# Patient Record
Sex: FEMALE | Race: WHITE | HISPANIC OR LATINO | Employment: FULL TIME | URBAN - METROPOLITAN AREA
[De-identification: names, ages, dates, MRNs, and addresses within clinical notes are randomized per-mention and may not be internally consistent; named-entity substitution may affect disease eponyms.]

---

## 2017-05-03 ENCOUNTER — OFFICE VISIT (OUTPATIENT)
Dept: URGENT CARE | Facility: CLINIC | Age: 27
End: 2017-05-03
Payer: COMMERCIAL

## 2017-05-03 VITALS
OXYGEN SATURATION: 96 % | SYSTOLIC BLOOD PRESSURE: 120 MMHG | DIASTOLIC BLOOD PRESSURE: 84 MMHG | BODY MASS INDEX: 27.98 KG/M2 | WEIGHT: 153 LBS | HEART RATE: 72 BPM | TEMPERATURE: 98.2 F | RESPIRATION RATE: 16 BRPM

## 2017-05-03 DIAGNOSIS — M67.432 GANGLION CYST OF VOLAR ASPECT OF LEFT WRIST: ICD-10-CM

## 2017-05-03 PROCEDURE — 99213 OFFICE O/P EST LOW 20 MIN: CPT | Performed by: PHYSICIAN ASSISTANT

## 2017-05-03 RX ORDER — FLUOXETINE HYDROCHLORIDE 20 MG/1
20 CAPSULE ORAL DAILY
COMMUNITY
End: 2018-02-01

## 2017-05-03 ASSESSMENT — ENCOUNTER SYMPTOMS
WEAKNESS: 0
FEVER: 0
JOINT SWELLING: 0
CONSTITUTIONAL NEGATIVE: 1
NEUROLOGICAL NEGATIVE: 1
NUMBNESS: 0
MUSCULOSKELETAL NEGATIVE: 1

## 2017-05-03 NOTE — MR AVS SNAPSHOT
Deysi Goodrich   5/3/2017 5:45 PM   Office Visit   MRN: [id number]    Department:  [facility]   Dept Phone:  [phone]    Description:  Female : 1990   Provider:  Silvano Oakes PA-C           Reason for Visit     Nodule x 1 year,painful bump on Rt. wrist      Allergies as of 5/3/2017     Allergen Noted Reactions    Nkda [No Known Drug Allergy] 2008         You were diagnosed with     Ganglion cyst of volar aspect of left wrist   [[id number]]         Vital Signs     Blood Pressure Pulse Temperature Respirations Weight Oxygen Saturation    120/84 mmHg 72 36.8 °C (98.2 °F) 16 69.4 kg (153 lb) 96%    Smoking Status                   Former Smoker           Basic Information     Date Of Birth Sex Race Ethnicity Preferred Language    1990 Female White Non- English      Problem List              ICD-10-CM Priority Class Noted - Resolved    Mild dysplasia of cervix (KAREL I) N87.0   3/16/2010 - Present    Panic attacks F41.0   2013 - Present    Right knee pain M25.561   2013 - Present    Internal derangement of knee- right    2013 - Present    Iron deficiency    2014 - Present      Health Maintenance        Date Due Completion Dates    IMM HEP B VACCINE (1 of 3 - Primary Series) 1989 ---    IMM HEP A VACCINE (1 of 2 - Standard Series) 1990 ---    IMM VARICELLA (CHICKENPOX) VACCINE (1 of 2 - 2 Dose Adolescent Series) 2002 ---    IMM DTaP/Tdap/Td Vaccine (1 - Tdap) 2008 ---    PAP SMEAR 3/31/2019 3/31/2016, 10/14/2013, 10/16/2012, 2011, 2010            Current Immunizations     No immunizations on file.      Below and/or attached are the medications your provider expects you to take. Review all of your home medications and newly ordered medications with your provider and/or pharmacist. Follow medication instructions as directed by your provider and/or pharmacist. Please keep your medication list with you and share with your provider. Update the  information when medications are discontinued, doses are changed, or new medications (including over-the-counter products) are added; and carry medication information at all times in the event of emergency situations     Allergies:  NKDA - (reactions not documented)               Medications  Valid as of: May 03, 2017 -  6:24 PM    Generic Name Brand Name Tablet Size Instructions for use    FLUoxetine HCl (Cap) PROZAC 20 MG Take 20 mg by mouth every day.        Hydrocodone-Acetaminophen (Tab) NORCO 5-325 MG Take 1-2 Tabs by mouth every four hours as needed.        Pantoprazole Sodium (Tablet Delayed Response) PROTONIX 20 MG Take 2 Tabs by mouth every day.        RaNITidine HCl (Tab) ZANTAC 150 MG Take 300 mg by mouth 2 times a day.        .                 Medicines prescribed today were sent to:     [facility] [address] [place] [address] - [address] [place]    [address] [facility] [address]    Phone: [phone] Fax: [phone]    Open 24 Hours?: Yes      Medication refill instructions:       If your prescription bottle indicates you have medication refills left, it is not necessary to call your provider’s office. Please contact your pharmacy and they will refill your medication.    If your prescription bottle indicates you do not have any refills left, you may request refills at any time through one of the following ways: The online Queryday system (except Urgent Care), by calling your provider’s office, or by asking your pharmacy to contact your provider’s office with a refill request. Medication refills are processed only during regular business hours and may not be available until the next business day. Your provider may request additional information or to have a follow-up visit with you prior to refilling your medication.   *Please Note: Medication refills are assigned a new Rx number when refilled electronically. Your pharmacy may indicate that no refills were authorized  even though a new prescription for the same medication is available at the pharmacy. Please request the medicine by name with the pharmacy before contacting your provider for a refill.           MyChart Access Code: Activation code not generated  Current Hyperpublict Status: Active

## 2017-05-04 NOTE — PROGRESS NOTES
Subjective:      Deysi Goodrich is a 26 y.o. female who presents with Nodule            Other  This is a chronic problem. The current episode started more than 1 month ago (ganglion cyst on wrist; ortho walk in declined to drain but pt has ortho appt next wk; would like to drain cyst temp.). The problem occurs constantly. The problem has been unchanged. Pertinent negatives include no fever, joint swelling, numbness or weakness. Nothing aggravates the symptoms. She has tried rest for the symptoms. The treatment provided no relief.       Review of Systems   Constitutional: Negative.  Negative for fever.   Musculoskeletal: Negative.  Negative for joint swelling.   Skin: Negative.    Neurological: Negative.  Negative for weakness and numbness.          Objective:     /84 mmHg  Pulse 72  Temp(Src) 36.8 °C (98.2 °F)  Resp 16  Wt 69.4 kg (153 lb)  SpO2 96%     Physical Exam   Constitutional: She is oriented to person, place, and time. She appears well-developed and well-nourished. No distress.   Musculoskeletal: Normal range of motion. She exhibits tenderness. She exhibits no edema.   Small ganglion cyst L wrist ~2cm  Proc- 1% lidoc local; betadine swab x 3 prep; 18ga needle aspir; no return except tiny amt of thick viscous synovial fld; press.dssg.   Neurological: She is alert and oriented to person, place, and time. No sensory deficit. She exhibits normal muscle tone. Coordination normal.   Skin: Skin is warm and dry. No erythema.   Psychiatric: She has a normal mood and affect. Her behavior is normal. Judgment and thought content normal.   Nursing note and vitals reviewed.    Filed Vitals:    05/03/17 1739   BP: 120/84   Pulse: 72   Temp: 36.8 °C (98.2 °F)   Resp: 16   Weight: 69.4 kg (153 lb)   SpO2: 96%     Active Ambulatory Problems     Diagnosis Date Noted   • Mild dysplasia of cervix (KAREL I) 03/16/2010   • Panic attacks 07/01/2013   • Right knee pain 07/01/2013   • Internal derangement of knee- right  08/20/2013   • Iron deficiency 05/14/2014     Resolved Ambulatory Problems     Diagnosis Date Noted   • Syncope 06/05/2012   • Palpitations 06/05/2012   • Lower urinary tract infectious disease 07/01/2013     Past Medical History   Diagnosis Date   • Bleeding nose chronic   • Migraine [id number]     Current Outpatient Prescriptions on File Prior to Visit   Medication Sig Dispense Refill   • ranitidine (ZANTAC) 150 MG Tab Take 300 mg by mouth 2 times a day.     • hydrocodone-acetaminophen (NORCO) 5-325 MG Tab per tablet Take 1-2 Tabs by mouth every four hours as needed. 20 Tab 0   • pantoprazole (PROTONIX) 20 MG tablet Take 2 Tabs by mouth every day. 30 Tab 0     No current facility-administered medications on file prior to visit.     Gargles, Cepacol lozenges, Aleve/Advil as needed for throat pain  Family History   Problem Relation Age of Onset   • Cancer Mother      Cervical   • Hypertension Father      Nkda              Assessment/Plan:     ·  ganglion cyst L wrist      · F/u ortho as sched

## 2017-05-11 ENCOUNTER — HOSPITAL ENCOUNTER (OUTPATIENT)
Facility: MEDICAL CENTER | Age: 27
End: 2017-05-11
Attending: OBSTETRICS & GYNECOLOGY
Payer: COMMERCIAL

## 2017-05-11 PROCEDURE — 87591 N.GONORRHOEAE DNA AMP PROB: CPT

## 2017-05-11 PROCEDURE — 87624 HPV HI-RISK TYP POOLED RSLT: CPT

## 2017-05-11 PROCEDURE — 88175 CYTOPATH C/V AUTO FLUID REDO: CPT

## 2017-05-11 PROCEDURE — 87491 CHLMYD TRACH DNA AMP PROBE: CPT

## 2017-05-13 LAB
C TRACH DNA GENITAL QL NAA+PROBE: NEGATIVE
CYTOLOGY REG CYTOL: NORMAL
N GONORRHOEA DNA GENITAL QL NAA+PROBE: NEGATIVE
SPECIMEN SOURCE: NORMAL

## 2017-05-16 LAB
HPV HR 12 DNA CVX QL NAA+PROBE: POSITIVE
HPV16 DNA SPEC QL NAA+PROBE: NEGATIVE
HPV18 DNA SPEC QL NAA+PROBE: NEGATIVE
SPECIMEN SOURCE: ABNORMAL

## 2017-10-13 ENCOUNTER — HOSPITAL ENCOUNTER (OUTPATIENT)
Facility: MEDICAL CENTER | Age: 27
End: 2017-10-13
Attending: NURSE PRACTITIONER
Payer: COMMERCIAL

## 2017-10-13 PROCEDURE — 87591 N.GONORRHOEAE DNA AMP PROB: CPT

## 2017-10-13 PROCEDURE — 87491 CHLMYD TRACH DNA AMP PROBE: CPT

## 2017-10-17 LAB
C TRACH DNA SPEC QL NAA+PROBE: NEGATIVE
N GONORRHOEA DNA SPEC QL NAA+PROBE: NEGATIVE
SPECIMEN SOURCE: NORMAL

## 2018-02-01 ENCOUNTER — OFFICE VISIT (OUTPATIENT)
Dept: MEDICAL GROUP | Facility: PHYSICIAN GROUP | Age: 28
End: 2018-02-01
Payer: COMMERCIAL

## 2018-02-01 VITALS
WEIGHT: 172 LBS | BODY MASS INDEX: 31.65 KG/M2 | RESPIRATION RATE: 16 BRPM | OXYGEN SATURATION: 99 % | HEIGHT: 62 IN | DIASTOLIC BLOOD PRESSURE: 86 MMHG | HEART RATE: 100 BPM | TEMPERATURE: 97.9 F | SYSTOLIC BLOOD PRESSURE: 138 MMHG

## 2018-02-01 DIAGNOSIS — Z00.00 ROUTINE HEALTH MAINTENANCE: ICD-10-CM

## 2018-02-01 DIAGNOSIS — E55.9 VITAMIN D INSUFFICIENCY: ICD-10-CM

## 2018-02-01 DIAGNOSIS — F43.10 PTSD (POST-TRAUMATIC STRESS DISORDER): ICD-10-CM

## 2018-02-01 DIAGNOSIS — Z76.89 ENCOUNTER TO ESTABLISH CARE: ICD-10-CM

## 2018-02-01 DIAGNOSIS — N92.6 MISSED PERIOD: ICD-10-CM

## 2018-02-01 DIAGNOSIS — F41.0 PANIC ATTACK: ICD-10-CM

## 2018-02-01 DIAGNOSIS — E78.5 DYSLIPIDEMIA: ICD-10-CM

## 2018-02-01 DIAGNOSIS — F33.2 SEVERE EPISODE OF RECURRENT MAJOR DEPRESSIVE DISORDER, WITHOUT PSYCHOTIC FEATURES (HCC): ICD-10-CM

## 2018-02-01 PROBLEM — F32.9 MAJOR DEPRESSIVE DISORDER: Status: ACTIVE | Noted: 2018-02-01

## 2018-02-01 PROCEDURE — 99214 OFFICE O/P EST MOD 30 MIN: CPT | Performed by: NURSE PRACTITIONER

## 2018-02-01 RX ORDER — FLUOXETINE 20 MG/1
20 TABLET, FILM COATED ORAL DAILY
Qty: 90 TAB | Refills: 0 | Status: SHIPPED | OUTPATIENT
Start: 2018-02-01 | End: 2018-03-29

## 2018-02-02 ENCOUNTER — TELEPHONE (OUTPATIENT)
Dept: URGENT CARE | Facility: PHYSICIAN GROUP | Age: 28
End: 2018-02-02

## 2018-02-02 NOTE — ASSESSMENT & PLAN NOTE
[facility] with [facility] disorder, PTSD, and major depressive disorder. Her family also has major depressive disorder in a few members. She is requesting to be placed back on medication for treatment.   Previously on fluoxetine and was doing well. Follows Eyad Tejada for counseling beginning this past month, currently seeing him weekly.     FAUSTINO 7 Score: 19  Patient Health Questionaire    Little interest or pleasure in doing things?: 3   Feeling down, depressed, or hopeless?: 3  Trouble falling or staying asleep, or sleeping too much? : 2  Feeling tired or having little energy? : 3  Poor appetite or overeating? : 3  Feeling bad about yourself - or that you are a failure or have let yourself or your family down? : 2  Trouble concentrating on things, such as reading the newspaper or watching television? : 2  Moving or speaking so slowly that other people could have noticed.  Or the opposite - being so fidgety or restless that you have been moving around alot more than usual. : 1  Thoughts that you would be better off dead, or of hurting yourself?: 1  Patient Health Questionnaire Score: 20    Interpretation of PHQ-9 Total Score   Score Severity   20-27 Severe Depression

## 2018-02-02 NOTE — ASSESSMENT & PLAN NOTE
Not on any supplementation   Ref. Range 9/30/2016 09:05   25-Hydroxy   Vitamin D 25 Latest Ref Range: 30 - 100 ng/mL 29 (L)

## 2018-02-02 NOTE — TELEPHONE ENCOUNTER
Pharmacy called regarding her prozac medication. They stated that her insurance will not cover the tablets but they will cover the capsules. I asked Marti and we agreed that that switch was fine.     Thanks

## 2018-02-02 NOTE — ASSESSMENT & PLAN NOTE
Has hx of dyslipidemia and is due for labs for monitoring. Not on any treatment.   Ref. Range 9/30/2016 09:05   Cholesterol,Tot Latest Ref Range: 100 - 199 mg/dL 169   Triglycerides Latest Ref Range: 0 - 149 mg/dL 76   HDL Latest Ref Range: >=40 mg/dL 38 (A)   LDL Latest Ref Range: <100 mg/dL 116 (H)

## 2018-02-03 PROBLEM — F43.10 PTSD (POST-TRAUMATIC STRESS DISORDER): Status: ACTIVE | Noted: 2018-02-03

## 2018-02-03 ASSESSMENT — PATIENT HEALTH QUESTIONNAIRE - PHQ9
9. THOUGHTS THAT YOU WOULD BE BETTER OFF DEAD, OR OF HURTING YOURSELF: 1
6. FEELING BAD ABOUT YOURSELF - OR THAT YOU ARE A FAILURE OR HAVE LET YOURSELF OR YOUR FAMILY DOWN: 2
8. MOVING OR SPEAKING SO SLOWLY THAT OTHER PEOPLE COULD HAVE NOTICED. OR THE OPPOSITE, BEING SO FIGETY OR RESTLESS THAT YOU HAVE BEEN MOVING AROUND A LOT MORE THAN USUAL: 1
7. TROUBLE CONCENTRATING ON THINGS, SUCH AS READING THE NEWSPAPER OR WATCHING TELEVISION: 2
SUM OF ALL RESPONSES TO PHQ9 QUESTIONS 1 AND 2: 6
SUM OF ALL RESPONSES TO PHQ QUESTIONS 1-9: 20
3. TROUBLE FALLING OR STAYING ASLEEP OR SLEEPING TOO MUCH: 2
1. LITTLE INTEREST OR PLEASURE IN DOING THINGS: 3
4. FEELING TIRED OR HAVING LITTLE ENERGY: 3
2. FEELING DOWN, DEPRESSED, IRRITABLE, OR HOPELESS: 3
5. POOR APPETITE OR OVEREATING: 3

## 2018-02-04 NOTE — PROGRESS NOTES
Chief Complaint   Patient presents with   • Establish Care     establish care/ antidepressants       HPI:    Deysi Goodrich is a 27 y.o. female here to establish care  Panic attacks  [facility] with [facility] anxiety disorder, PTSD, and major depressive disorder. Her family also has major depressive disorder in a few members. She is requesting to be placed back on medication for treatment.   Previously on fluoxetine and was doing well. Follows Eyad Tejada for counseling beginning this past month, currently seeing him weekly.     Vitamin D insufficiency  Not on any supplementation   Ref. Range 9/30/2016 09:05   25-Hydroxy   Vitamin D 25 Latest Ref Range: 30 - 100 ng/mL 29 (L)       Dyslipidemia  Has hx of dyslipidemia and is due for labs for monitoring. Not on any treatment.   Ref. Range 9/30/2016 09:05   Cholesterol,Tot Latest Ref Range: 100 - 199 mg/dL 169   Triglycerides Latest Ref Range: 0 - 149 mg/dL 76   HDL Latest Ref Range: >=40 mg/dL 38 (A)   LDL Latest Ref Range: <100 mg/dL 116 (H)       BMI 31.0-31.9,adult  Does not exercise or monitor diet closely     Current medicines (including changes today)  Current Outpatient Prescriptions   Medication Sig Dispense Refill   • fluoxetine (PROZAC) 20 MG tablet Take 1 Tab by mouth every day. 90 Tab 0   • ranitidine (ZANTAC) 150 MG Tab Take 300 mg by mouth 2 times a day.     • hydrocodone-acetaminophen (NORCO) 5-325 MG Tab per tablet Take 1-2 Tabs by mouth every four hours as needed. 20 Tab 0   • pantoprazole (PROTONIX) 20 MG tablet Take 2 Tabs by mouth every day. 30 Tab 0     No current facility-administered medications for this visit.      She  has a past medical history of Anxiety; Bleeding nose (chronic); Depression; Migraine ([id number]); Mild dysplasia of cervix (KAREL I) (3/16/2010); Palpitations (6/5/2012); PTSD (post-traumatic stress disorder); and Syncope (6/5/2012). She also has no past medical history of ASTHMA or Diabetes.  She  has no  past surgical history on file.  Social History   Substance Use Topics   • Smoking status: Former Smoker     Years: 3.00     Types: Cigarettes     Quit date: 2009   • Smokeless tobacco: Never Used   • Alcohol use 1.2 oz/week     2 Shots of liquor per week     Social History     Social History Narrative   • No narrative on file     Family History   Problem Relation Age of Onset   • Cancer Mother      Cervical   • Depression Mother    • Anxiety disorder Mother    • Alcohol/Drug Mother    • Hypertension Father    • Anxiety disorder Father    • Heart Disease Father    • Alcohol abuse Father    • Hyperlipidemia Father    • Alcohol/Drug Maternal Uncle    • Anxiety disorder Paternal Aunt    • Depression Paternal Aunt    • Colon Cancer Paternal Aunt 58   • Alcohol abuse Paternal Uncle    • Depression Maternal Grandmother    • Anxiety disorder Maternal Grandmother    • Heart Attack Maternal Grandfather    • Alcohol abuse Maternal Grandfather    • Depression Paternal Grandmother    • Anxiety disorder Paternal Grandmother    • Alcohol abuse Paternal Grandmother    • Diabetes Paternal Grandfather    • Hypertension Paternal Grandfather    • Alcohol abuse Paternal Grandfather    • Anxiety disorder Paternal Aunt    • Depression Paternal Aunt    • Alcohol abuse Paternal Aunt    • Anxiety disorder Paternal Aunt    • Depression Paternal Aunt    • Alcohol/Drug Maternal Uncle    • Stroke Neg Hx      Family Status   Relation Status   • Mother Alive   • Father Alive   • Sister Other    car accident   • Maternal Uncle Alive   • Paternal Aunt Alive   • Paternal Uncle    • Maternal Grandmother Alive   • Maternal Grandfather     MI   • Paternal Grandmother Alive   • Paternal Grandfather    • Paternal Aunt Alive   • Paternal Aunt Alive   • Maternal Uncle Alive   • Neg Hx      Health Maintenance Topics with due status: Overdue       Topic Date Due    IMM DTaP/Tdap/Td Vaccine 2008    IMM INFLUENZA 2017     "    ROS       Objective:     Blood pressure 138/86, pulse 100, temperature 36.6 °C (97.9 °F), resp. rate 16, height 1.575 m (5' 2\"), weight 78 kg (172 lb), SpO2 99 %. Body mass index is 31.46 kg/m².  Physical Exam:  Alert, oriented in no acute distress.  Eye contact is good, speech goal directed, affect pleasant and calm  HEENT: EOMI, conjunctiva non-injected, sclera non-icteric. No lid edema or eye drainage  Gross hearing intact   Lower extremities color normal, vascularity normal, no edema  Skin: No rashes or lesions in visible areas  Neuro: CNs grossly intact. Gait steady.         Assessment and Plan:   Assessment/Plan:  1. Encounter to establish care    2. 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 Continue with therapy with psych. Check labs to ensure no other contributing factors that would worsen this. F/u 6 week.   - CBC WITH DIFFERENTIAL; Future  - COMP METABOLIC PANEL; Future  - TSH WITH REFLEX TO FT4; Future  - VITAMIN B12; Future    3. Panic attacks  Same as #2  - fluoxetine (PROZAC) 20 MG tablet; Take 1 Tab by mouth every day.  Dispense: 90 Tab; Refill: 0    4. PTSD (post-traumatic stress disorder)  Same as #2    5. BMI 31.0-31.9,adult  - Patient identified as having weight management issue.  Appropriate orders and counseling given.    6. Vitamin D insufficiency  Check labs and determine supplementation dose needed  - VITAMIN D,25 HYDROXY; Future    7. Dyslipidemia  Check labs and enc increased exercise and mediterranean diet   - LIPID PROFILE; Future    8. Missed period  - HCG QUANTITATIVE; Future    9. Routine health maintenance  - CBC WITH DIFFERENTIAL; Future  - COMP METABOLIC PANEL; Future    The total time spent seeing the patient in consultation, and formulating an action plan for this visit was 25 minutes.  Greater than 50% of this time was spent face to face counseling, discussing problems documented " above, coordinating care and answering questions by the provider.        Follow up:  Return in about 6 weeks (around 3/15/2018).    Educated in proper administration of medication(s) ordered today including safety, possible SE, risks, benefits, rationale and alternatives to therapy.   Supportive care, differential diagnoses, and indications for immediate follow-up discussed with patient.    Pathogenesis of diagnosis discussed including typical length and natural progression.    Instructed to return to clinic or nearest emergency department for any change in condition, further concerns, or worsening of symptoms.  Patient states understanding of the plan of care and discharge instructions.    Please note that this dictation was created using voice recognition software. I have made every reasonable attempt to correct obvious errors, but I expect that there are errors of grammar and possibly content that I did not discover before finalizing the note.    Followup: Return in about 6 weeks (around 3/15/2018). sooner should new symptoms or problems arise.

## 2018-02-15 ENCOUNTER — HOSPITAL ENCOUNTER (OUTPATIENT)
Dept: LAB | Facility: MEDICAL CENTER | Age: 28
End: 2018-02-15
Attending: NURSE PRACTITIONER
Payer: COMMERCIAL

## 2018-02-15 DIAGNOSIS — Z00.00 ROUTINE HEALTH MAINTENANCE: ICD-10-CM

## 2018-02-15 DIAGNOSIS — E78.5 DYSLIPIDEMIA: ICD-10-CM

## 2018-02-15 DIAGNOSIS — F33.2 SEVERE EPISODE OF RECURRENT MAJOR DEPRESSIVE DISORDER, WITHOUT PSYCHOTIC FEATURES (HCC): ICD-10-CM

## 2018-02-15 DIAGNOSIS — E55.9 VITAMIN D INSUFFICIENCY: ICD-10-CM

## 2018-02-15 DIAGNOSIS — N92.6 MISSED PERIOD: ICD-10-CM

## 2018-02-15 LAB
25(OH)D3 SERPL-MCNC: 12 NG/ML (ref 30–100)
ALBUMIN SERPL BCP-MCNC: 4.4 G/DL (ref 3.2–4.9)
ALBUMIN/GLOB SERPL: 1.6 G/DL
ALP SERPL-CCNC: 45 U/L (ref 30–99)
ALT SERPL-CCNC: 12 U/L (ref 2–50)
ANION GAP SERPL CALC-SCNC: 6 MMOL/L (ref 0–11.9)
AST SERPL-CCNC: 15 U/L (ref 12–45)
B-HCG SERPL-ACNC: <0.6 MIU/ML (ref 0–5)
BASOPHILS # BLD AUTO: 0.6 % (ref 0–1.8)
BASOPHILS # BLD: 0.03 K/UL (ref 0–0.12)
BILIRUB SERPL-MCNC: 0.7 MG/DL (ref 0.1–1.5)
BUN SERPL-MCNC: 14 MG/DL (ref 8–22)
CALCIUM SERPL-MCNC: 9.2 MG/DL (ref 8.5–10.5)
CHLORIDE SERPL-SCNC: 107 MMOL/L (ref 96–112)
CHOLEST SERPL-MCNC: 141 MG/DL (ref 100–199)
CO2 SERPL-SCNC: 25 MMOL/L (ref 20–33)
CREAT SERPL-MCNC: 0.59 MG/DL (ref 0.5–1.4)
EOSINOPHIL # BLD AUTO: 0.1 K/UL (ref 0–0.51)
EOSINOPHIL NFR BLD: 2 % (ref 0–6.9)
ERYTHROCYTE [DISTWIDTH] IN BLOOD BY AUTOMATED COUNT: 40.2 FL (ref 35.9–50)
GLOBULIN SER CALC-MCNC: 2.8 G/DL (ref 1.9–3.5)
GLUCOSE SERPL-MCNC: 84 MG/DL (ref 65–99)
HCT VFR BLD AUTO: 41.8 % (ref 37–47)
HDLC SERPL-MCNC: 43 MG/DL
HGB BLD-MCNC: 14 G/DL (ref 12–16)
IMM GRANULOCYTES # BLD AUTO: 0.01 K/UL (ref 0–0.11)
IMM GRANULOCYTES NFR BLD AUTO: 0.2 % (ref 0–0.9)
LDLC SERPL CALC-MCNC: 87 MG/DL
LYMPHOCYTES # BLD AUTO: 1.38 K/UL (ref 1–4.8)
LYMPHOCYTES NFR BLD: 28.1 % (ref 22–41)
MCH RBC QN AUTO: 29.9 PG (ref 27–33)
MCHC RBC AUTO-ENTMCNC: 33.5 G/DL (ref 33.6–35)
MCV RBC AUTO: 89.1 FL (ref 81.4–97.8)
MONOCYTES # BLD AUTO: 0.3 K/UL (ref 0–0.85)
MONOCYTES NFR BLD AUTO: 6.1 % (ref 0–13.4)
NEUTROPHILS # BLD AUTO: 3.09 K/UL (ref 2–7.15)
NEUTROPHILS NFR BLD: 63 % (ref 44–72)
NRBC # BLD AUTO: 0 K/UL
NRBC BLD-RTO: 0 /100 WBC
PLATELET # BLD AUTO: 261 K/UL (ref 164–446)
PMV BLD AUTO: 10.8 FL (ref 9–12.9)
POTASSIUM SERPL-SCNC: 4.4 MMOL/L (ref 3.6–5.5)
PROT SERPL-MCNC: 7.2 G/DL (ref 6–8.2)
RBC # BLD AUTO: 4.69 M/UL (ref 4.2–5.4)
SODIUM SERPL-SCNC: 138 MMOL/L (ref 135–145)
TRIGL SERPL-MCNC: 54 MG/DL (ref 0–149)
TSH SERPL DL<=0.005 MIU/L-ACNC: 1.12 UIU/ML (ref 0.38–5.33)
VIT B12 SERPL-MCNC: 298 PG/ML (ref 211–911)
WBC # BLD AUTO: 4.9 K/UL (ref 4.8–10.8)

## 2018-02-15 PROCEDURE — 80061 LIPID PANEL: CPT

## 2018-02-15 PROCEDURE — 84443 ASSAY THYROID STIM HORMONE: CPT

## 2018-02-15 PROCEDURE — 36415 COLL VENOUS BLD VENIPUNCTURE: CPT

## 2018-02-15 PROCEDURE — 84702 CHORIONIC GONADOTROPIN TEST: CPT

## 2018-02-15 PROCEDURE — 85025 COMPLETE CBC W/AUTO DIFF WBC: CPT

## 2018-02-15 PROCEDURE — 82306 VITAMIN D 25 HYDROXY: CPT

## 2018-02-15 PROCEDURE — 80053 COMPREHEN METABOLIC PANEL: CPT

## 2018-02-15 PROCEDURE — 82607 VITAMIN B-12: CPT

## 2018-03-29 ENCOUNTER — OFFICE VISIT (OUTPATIENT)
Dept: MEDICAL GROUP | Facility: PHYSICIAN GROUP | Age: 28
End: 2018-03-29
Payer: COMMERCIAL

## 2018-03-29 VITALS
WEIGHT: 165 LBS | BODY MASS INDEX: 30.36 KG/M2 | SYSTOLIC BLOOD PRESSURE: 122 MMHG | HEART RATE: 79 BPM | TEMPERATURE: 98.6 F | DIASTOLIC BLOOD PRESSURE: 84 MMHG | HEIGHT: 62 IN | OXYGEN SATURATION: 96 %

## 2018-03-29 DIAGNOSIS — F33.1 MODERATE EPISODE OF RECURRENT MAJOR DEPRESSIVE DISORDER (HCC): ICD-10-CM

## 2018-03-29 DIAGNOSIS — R51.9 UNILATERAL OCCIPITAL HEADACHE: ICD-10-CM

## 2018-03-29 PROCEDURE — 99214 OFFICE O/P EST MOD 30 MIN: CPT | Performed by: NURSE PRACTITIONER

## 2018-03-29 RX ORDER — FLUOXETINE HYDROCHLORIDE 40 MG/1
40 CAPSULE ORAL DAILY
Qty: 90 CAP | Refills: 1 | Status: SHIPPED | OUTPATIENT
Start: 2018-03-29 | End: 2018-06-06

## 2018-03-29 RX ORDER — TIZANIDINE 4 MG/1
4 TABLET ORAL
Qty: 60 TAB | Refills: 0 | Status: SHIPPED | OUTPATIENT
Start: 2018-03-29 | End: 2018-05-15 | Stop reason: SDUPTHER

## 2018-03-29 RX ORDER — BIOTIN 10 MG
TABLET ORAL
COMMUNITY

## 2018-03-29 ASSESSMENT — PATIENT HEALTH QUESTIONNAIRE - PHQ9
SUM OF ALL RESPONSES TO PHQ QUESTIONS 1-9: 14
1. LITTLE INTEREST OR PLEASURE IN DOING THINGS: 2
3. TROUBLE FALLING OR STAYING ASLEEP OR SLEEPING TOO MUCH: 2
CLINICAL INTERPRETATION OF PHQ2 SCORE: 4
2. FEELING DOWN, DEPRESSED, IRRITABLE, OR HOPELESS: 2
7. TROUBLE CONCENTRATING ON THINGS, SUCH AS READING THE NEWSPAPER OR WATCHING TELEVISION: 2
8. MOVING OR SPEAKING SO SLOWLY THAT OTHER PEOPLE COULD HAVE NOTICED. OR THE OPPOSITE, BEING SO FIGETY OR RESTLESS THAT YOU HAVE BEEN MOVING AROUND A LOT MORE THAN USUAL: 0
SUM OF ALL RESPONSES TO PHQ9 QUESTIONS 1 AND 2: 4
4. FEELING TIRED OR HAVING LITTLE ENERGY: 2
5. POOR APPETITE OR OVEREATING: 3 - NEARLY EVERY DAY
6. FEELING BAD ABOUT YOURSELF - OR THAT YOU ARE A FAILURE OR HAVE LET YOURSELF OR YOUR FAMILY DOWN: 2
9. THOUGHTS THAT YOU WOULD BE BETTER OFF DEAD, OR OF HURTING YOURSELF: 0
5. POOR APPETITE OR OVEREATING: 2
SUM OF ALL RESPONSES TO PHQ QUESTIONS 1-9: 15

## 2018-03-30 NOTE — ASSESSMENT & PLAN NOTE
[facility] with [facility] anxiety disorder, PTSD, and major depressive disorder. Previously on fluoxetine and was doing well. Restarted her on 20 mg 8 weeks ago and she reports improvement, but still experiencing some irritability and fatigue. Follows Eyad Tejada for counseling beginning this past month, currently seeing him weekly.    FAUSTINO 7 Score: 13  Patient Health Questionaire    Little interest or pleasure in doing things?: 2   Feeling down, depressed, or hopeless?: 2  Trouble falling or staying asleep, or sleeping too much? : 2  Feeling tired or having little energy? : 2  Poor appetite or overeating? : 2  Feeling bad about yourself - or that you are a failure or have let yourself or your family down? : 2  Trouble concentrating on things, such as reading the newspaper or watching television? : 2  Moving or speaking so slowly that other people could have noticed.  Or the opposite - being so fidgety or restless that you have been moving around alot more than usual. : 0  Thoughts that you would be better off dead, or of hurting yourself?: 0  Patient Health Questionnaire Score: 14 (down from 20 on 2/3/18)    Interpretation of PHQ-9 Total Score   Score Severity   10-14 Moderate Depression

## 2018-03-30 NOTE — ASSESSMENT & PLAN NOTE
This is an ongoing problem over the past year. She has hx of migraines, and this headache pain is different.   Previously it was not occurring daily, so it has increased in frequency.   They start in the afternoon to evening. She never has them in the mornings.   They occur less on the weekends when not working, but even still they occur in the evening.   The pain is right occipital lobe and right neck region. Every now and again it is across forehead.   No N/V. No photophobia or phonophobia.  No neck pain or stiffness. No dizziness.    She has not had her eyes checked for awhile and does spend a lot of her day looking at computer. She has appointment with optometrist next week.   She has two monitors that are at eye level, but has to turn head to right to look at the one frequently. She did have an  help place work space to fit her.   She drinks plenty of water. She is getting at least 8 hours of sleep a night.   Treatments tried: aspirin 375 mg 2-3 tabs daily. Usually takes this in the afternoon.

## 2018-03-30 NOTE — PROGRESS NOTES
Subjective:     Chief Complaint   Patient presents with   • Headache     med review       HPI  Deysi Goodrich is a 27 y.o. female here today for headache and depression     Headache  This is an ongoing problem over the past year. She has hx of migraines, and this headache pain is different.   Previously it was not occurring daily, so it has increased in frequency.   They start in the afternoon to evening. She never has them in the mornings.   They occur less on the weekends when not working, but even still they occur in the evening.   The pain is right occipital lobe and right neck region. Every now and again it is across forehead.   No N/V. No photophobia or phonophobia.  No neck pain or stiffness. No dizziness.    She has not had her eyes checked for awhile and does spend a lot of her day looking at computer. She has appointment with optometrist next week.   She has two monitors that are at eye level, but has to turn head to right to look at the one frequently. She did have an  help place work space to fit her.   She drinks plenty of water. She is getting at least 8 hours of sleep a night.   Treatments tried: aspirin 375 mg 2-3 tabs daily. Usually takes this in the afternoon.     Major depressive disorder  [facility] with generalized anxiety disorder, PTSD, and major depressive disorder. Previously on fluoxetine and was doing well. Restarted her on 20 mg 8 weeks ago and she reports improvement, but still experiencing some irritability and fatigue. Follows Eyad Tejada for counseling beginning this past month, currently seeing him weekly.    FAUSTINO 7 Score: 13  Patient Health Questionaire    Little interest or pleasure in doing things?: 2   Feeling down, depressed, or hopeless?: 2  Trouble falling or staying asleep, or sleeping too much? : 2  Feeling tired or having little energy? : 2  Poor appetite or overeating? : 2  Feeling bad about yourself - or that you are a failure or  "have let yourself or your family down? : 2  Trouble concentrating on things, such as reading the newspaper or watching television? : 2  Moving or speaking so slowly that other people could have noticed.  Or the opposite - being so fidgety or restless that you have been moving around alot more than usual. : 0  Thoughts that you would be better off dead, or of hurting yourself?: 0  Patient Health Questionnaire Score: 14 (down from 20 on 2/3/18)    Interpretation of PHQ-9 Total Score   Score Severity   10-14 Moderate Depression            Diagnoses of Unilateral occipital headache and Moderate episode of recurrent major depressive disorder (CMS-HCC) were pertinent to this visit.    Allergies: Nkda [no known drug allergy]  Current medicines (including changes today)  Current Outpatient Prescriptions   Medication Sig Dispense Refill   • Cyanocobalamin (VITAMIN B-12) 3000 MCG SL Tab Place  under tongue.     • Cholecalciferol (VITAMIN D-3) 5000 units Tab Take  by mouth.     • tizanidine (ZANAFLEX) 4 MG Tab Take 1 Tab by mouth every bedtime. 60 Tab 0   • fluoxetine (PROZAC) 40 MG capsule Take 1 Cap by mouth every day. 90 Cap 1   • ranitidine (ZANTAC) 150 MG Tab Take 300 mg by mouth 2 times a day.       No current facility-administered medications for this visit.        She  has a past medical history of Anxiety; Bleeding nose (chronic); Depression; Migraine ([id number]); Mild dysplasia of cervix (KAREL I) (3/16/2010); Palpitations (6/5/2012); PTSD (post-traumatic stress disorder); and Syncope (6/5/2012). She also has no past medical history of ASTHMA or Diabetes.        ROS  As stated in HPI   Gen: No F/C, weight loss  Neuro: +headache. No dizziness, numbness, tingling, weakness, change in gait, vision changes  GI: No N/V     Objective:     Blood pressure 122/84, pulse 79, temperature 37 °C (98.6 °F), height 1.575 m (5' 2\"), weight 74.8 kg (165 lb), SpO2 96 %. Body mass index is 30.18 kg/m².  Physical Exam:  General: Alert, " oriented, in no acute distress.  Eye contact is good, speech goal directed, affect calm  CNs grossly intact.  HEENT: conjunctiva non-injected, sclera non-icteric, EOMs intact. PERRL. No lid edema or eye drainage.   Gross hearing intact.  Neck: Supple. No occipital or cervical adenopathy. No tenderness or spasm of paraspinous muscles or upper trapezius. No spinous process tenderness. + trigger point at occiput right side. FAROM without pain through forward flexion, extension, left and right rotation, or ear to shoulder.  equal and strong bilat. FAROM of bilat shoulder and elbow. BUE strength 5/5. CN XI intact.   Skin: No rashes or lesions in visible areas  Gait steady.     Assessment and Plan:   Assessment/Plan:  1. Unilateral occipital headache  Suspect chronic daily headache d/t daily aspirin use, or occipital tension from work and turning head right to look at screen. Start muscle relaxer at bedtime with heating pads and stretching. Enc massage to focus on this area twice a month. Enc her to turn whole body to look at computer screen at work.   Also requested she stop aspirin. She may use Tylenol for headache relief maximum 3 days a week once a day for the next 3 weeks to try to get her out of daily headache acute relief. F/u 8 weeks  - tizanidine (ZANAFLEX) 4 MG Tab; Take 1 Tab by mouth every bedtime.  Dispense: 60 Tab; Refill: 0    2. Moderate episode of recurrent major depressive disorder (CMS-HCC)  Improving but not controlled. Discussed option of increasing fluoxetine, or bupropion. We will increase fluoxetine and f/u in 8 weeks. Continue counseling.   - fluoxetine (PROZAC) 40 MG capsule; Take 1 Cap by mouth every day.  Dispense: 90 Cap; Refill: 1  - Patient has been identified as being depressed and appropriate orders and counseling have been given         Follow up:  Return in about 8 weeks (around 5/24/2018).    Educated in proper administration of medication(s) ordered today including safety,  possible SE, risks, benefits, rationale and alternatives to therapy.   Supportive care, differential diagnoses, and indications for immediate follow-up discussed with patient.    Pathogenesis of diagnosis discussed including typical length and natural progression.    Instructed to return to clinic or nearest emergency department for any change in condition, further concerns, or worsening of symptoms.  Patient states understanding of the plan of care and discharge instructions.      Please note that this dictation was created using voice recognition software. I have made every reasonable attempt to correct obvious errors, but I expect that there are errors of grammar and possibly content that I did not discover before finalizing the note.    Followup: Return in about 8 weeks (around 5/24/2018). sooner should new symptoms or problems arise.

## 2018-05-14 ENCOUNTER — PATIENT MESSAGE (OUTPATIENT)
Dept: MEDICAL GROUP | Facility: PHYSICIAN GROUP | Age: 28
End: 2018-05-14

## 2018-05-14 DIAGNOSIS — R51.9 UNILATERAL OCCIPITAL HEADACHE: ICD-10-CM

## 2018-05-15 RX ORDER — TIZANIDINE 4 MG/1
4 TABLET ORAL EVERY 12 HOURS PRN
Qty: 60 TAB | Refills: 0 | Status: SHIPPED | OUTPATIENT
Start: 2018-05-15 | End: 2018-07-24 | Stop reason: SDUPTHER

## 2018-06-06 ENCOUNTER — OFFICE VISIT (OUTPATIENT)
Dept: MEDICAL GROUP | Facility: PHYSICIAN GROUP | Age: 28
End: 2018-06-06
Payer: COMMERCIAL

## 2018-06-06 VITALS
WEIGHT: 165 LBS | BODY MASS INDEX: 30.36 KG/M2 | SYSTOLIC BLOOD PRESSURE: 118 MMHG | DIASTOLIC BLOOD PRESSURE: 64 MMHG | HEIGHT: 62 IN | TEMPERATURE: 98.5 F | OXYGEN SATURATION: 92 % | HEART RATE: 75 BPM

## 2018-06-06 DIAGNOSIS — F33.1 MODERATE EPISODE OF RECURRENT MAJOR DEPRESSIVE DISORDER (HCC): ICD-10-CM

## 2018-06-06 DIAGNOSIS — F41.1 GAD (GENERALIZED ANXIETY DISORDER): ICD-10-CM

## 2018-06-06 DIAGNOSIS — G44.229 CHRONIC TENSION-TYPE HEADACHE, NOT INTRACTABLE: ICD-10-CM

## 2018-06-06 PROCEDURE — 99213 OFFICE O/P EST LOW 20 MIN: CPT | Performed by: NURSE PRACTITIONER

## 2018-06-06 RX ORDER — FLUOXETINE HYDROCHLORIDE 20 MG/1
20 CAPSULE ORAL DAILY
Qty: 90 CAP | Refills: 3 | Status: SHIPPED | OUTPATIENT
Start: 2018-06-06 | End: 2019-08-26 | Stop reason: SDUPTHER

## 2018-06-06 NOTE — ASSESSMENT & PLAN NOTE
Ongoing problem that is decreased over the past 2 months by 50% and intensity decreased by 50%. She went 3 full weeks without aspirin as we thought she may have chronic daily HA and now has only used it twice since. She has been doing yoga daily followed by massage at home, and chiropractor twice weekly. She also adjusted her work space to avoid turning her head at her station. Using muscle relaxers most days d/t continued sensation of tension.

## 2018-06-07 NOTE — ASSESSMENT & PLAN NOTE
Diagnosed [facility] 2013 with FAUSTINO, PTSD, and MDD. She had been off of her fluoxetine and was experiencing increasing depression. 8 weeks ago we increased fluoxetine from 20 mg to 40 mg. She does not really feel like there has been any significant improvement with the increase in dosage. She feels her paranoia is increasing. She feels like a police car siren is going on in brain, and she can go down into a rabbit hole with thinking about that one issue. She has not experienced any paranoia symptoms like this before.  She does continue to go through counseling and understands ebbs and flows of this, but her current paranoia is worse than in the past.

## 2018-06-08 ENCOUNTER — PATIENT MESSAGE (OUTPATIENT)
Dept: MEDICAL GROUP | Facility: PHYSICIAN GROUP | Age: 28
End: 2018-06-08

## 2018-06-08 DIAGNOSIS — R51.9 UNILATERAL OCCIPITAL HEADACHE: ICD-10-CM

## 2018-06-08 DIAGNOSIS — F41.1 GAD (GENERALIZED ANXIETY DISORDER): ICD-10-CM

## 2018-06-08 RX ORDER — BUSPIRONE HYDROCHLORIDE 7.5 MG/1
7.5 TABLET ORAL 2 TIMES DAILY
Qty: 14 TAB | Refills: 0 | Status: SHIPPED | OUTPATIENT
Start: 2018-06-08 | End: 2018-07-24

## 2018-06-08 RX ORDER — BUSPIRONE HYDROCHLORIDE 10 MG/1
10 TABLET ORAL 2 TIMES DAILY
Qty: 60 TAB | Refills: 0 | Status: SHIPPED | OUTPATIENT
Start: 2018-06-08 | End: 2018-07-24 | Stop reason: SDUPTHER

## 2018-06-08 NOTE — PROGRESS NOTES
Subjective:     Chief Complaint   Patient presents with   • Dyspareunia     med review       HPI  Deysi Goodrich is a 27 y.o. female here today for routine f/u     Headache  Ongoing problem that is decreased over the past 2 months by 50% and intensity decreased by 50%. She went 3 full weeks without aspirin as we thought she may have chronic daily HA and now has only used it twice since. She has been doing yoga daily followed by massage at home, and chiropractor twice weekly. She also adjusted her work space to avoid turning her head at her station. Using muscle relaxers most days d/t continued sensation of tension.     Major depressive disorder  Diagnosed [facility] 2013 with FAUSTINO, PTSD, and MDD. She had been off of her fluoxetine and was experiencing increasing depression. 8 weeks ago we increased fluoxetine from 20 mg to 40 mg. She does not really feel like there has been any significant improvement with the increase in dosage. She feels her paranoia is increasing. She feels like a police car siren is going on in brain, and she can go down into a rabbit hole with thinking about that one issue. She has not experienced any paranoia symptoms like this before.  She does continue to go through counseling and understands ebbs and flows of this, but her current paranoia is worse than in the past.          Diagnoses of Moderate episode of recurrent major depressive disorder (HCC), FAUSTINO (generalized anxiety disorder), and Chronic tension-type headache, not intractable were pertinent to this visit.    Allergies: Nkda [no known drug allergy]  Current medicines (including changes today)  Current Outpatient Prescriptions   Medication Sig Dispense Refill   • busPIRone (BUSPAR) 7.5 MG tablet Take 1 Tab by mouth 2 times a day. 14 Tab 0   • busPIRone (BUSPAR) 10 MG Tab Take 1 Tab by mouth 2 times a day. Start after 1 week of 7.5 mg BID complete 60 Tab 0   • FLUoxetine (PROZAC) 20 MG Cap Take 1 Cap by mouth every day. 90 Cap 3   •  "tizanidine (ZANAFLEX) 4 MG Tab Take 1 Tab by mouth every 12 hours as needed (muscle stiffness). 60 Tab 0   • Cyanocobalamin (VITAMIN B-12) 3000 MCG SL Tab Place  under tongue.     • Cholecalciferol (VITAMIN D-3) 5000 units Tab Take  by mouth.       No current facility-administered medications for this visit.        She  has a past medical history of Anxiety; Bleeding nose (chronic); Depression; Migraine ([id number]); Mild dysplasia of cervix (KAREL I) (3/16/2010); Palpitations (6/5/2012); PTSD (post-traumatic stress disorder); and Syncope (6/5/2012). She also has no past medical history of ASTHMA or Diabetes.        ROS  As stated in HPI     Objective:     Blood pressure 118/64, pulse 75, temperature 36.9 °C (98.5 °F), height 1.575 m (5' 2\"), weight 74.8 kg (165 lb), SpO2 92 %. Body mass index is 30.18 kg/m².  Physical Exam:  General: Alert, oriented, in no acute distress.  Eye contact is good, speech goal directed, affect calm  CNs grossly intact.  Gross hearing intact.  Gait steady.     Assessment and Plan:   Assessment/Plan:  1. Moderate episode of recurrent major depressive disorder (HCC)  Stable, now more sx of anxiety present. Continue fluoxetine but decrease to 20 mg daily. f/u 8 weeks.     2. FAUSTINO (generalized anxiety disorder)  Not controlled. Continue fluoxetine. Add buspirone. f/u by email in 3 weeks and in clinic in 6-8 weeks. Continue with therapy.   - busPIRone (BUSPAR) 7.5 MG tablet; Take 1 Tab by mouth 2 times a day.  Dispense: 14 Tab; Refill: 0  - busPIRone (BUSPAR) 10 MG Tab; Take 1 Tab by mouth 2 times a day. Start after 1 week of 7.5 mg BID complete  Dispense: 60 Tab; Refill: 0    3. Chronic tension-type headache, not intractable  Improving. Continue conservative management    The total time spent seeing the patient in consultation, and formulating an action plan for this visit was 20 minutes.  Greater than 50% of this time was spent face to face counseling, discussing problems documented above, " coordinating care and answering questions by the provider.        Follow up:  Return in about 8 years (around 6/6/2026).    Educated in proper administration of medication(s) ordered today including safety, possible SE, risks, benefits, rationale and alternatives to therapy.   Supportive care, differential diagnoses, and indications for immediate follow-up discussed with patient.    Pathogenesis of diagnosis discussed including typical length and natural progression.    Instructed to return to clinic or nearest emergency department for any change in condition, further concerns, or worsening of symptoms.  Patient states understanding of the plan of care and discharge instructions.      Please note that this dictation was created using voice recognition software. I have made every reasonable attempt to correct obvious errors, but I expect that there are errors of grammar and possibly content that I did not discover before finalizing the note.    Followup: Return in about 8 years (around 6/6/2026). sooner should new symptoms or problems arise.

## 2018-07-03 ENCOUNTER — HOSPITAL ENCOUNTER (OUTPATIENT)
Dept: LAB | Facility: MEDICAL CENTER | Age: 28
End: 2018-07-03
Attending: OBSTETRICS & GYNECOLOGY
Payer: COMMERCIAL

## 2018-07-03 PROCEDURE — 87591 N.GONORRHOEAE DNA AMP PROB: CPT

## 2018-07-03 PROCEDURE — 87491 CHLMYD TRACH DNA AMP PROBE: CPT

## 2018-07-03 PROCEDURE — 88175 CYTOPATH C/V AUTO FLUID REDO: CPT

## 2018-07-24 DIAGNOSIS — F41.1 GAD (GENERALIZED ANXIETY DISORDER): ICD-10-CM

## 2018-07-24 DIAGNOSIS — R51.9 UNILATERAL OCCIPITAL HEADACHE: ICD-10-CM

## 2018-07-24 RX ORDER — TIZANIDINE 4 MG/1
4 TABLET ORAL EVERY 12 HOURS PRN
Qty: 60 TAB | Refills: 0 | Status: CANCELLED | OUTPATIENT
Start: 2018-07-24

## 2018-07-24 RX ORDER — BUSPIRONE HYDROCHLORIDE 10 MG/1
10 TABLET ORAL 2 TIMES DAILY
Qty: 180 TAB | Refills: 0 | OUTPATIENT
Start: 2018-07-24

## 2018-07-24 RX ORDER — TIZANIDINE 4 MG/1
4 TABLET ORAL EVERY 12 HOURS PRN
Qty: 180 TAB | Refills: 0 | Status: SHIPPED | OUTPATIENT
Start: 2018-07-24 | End: 2018-11-27 | Stop reason: SDUPTHER

## 2018-07-24 RX ORDER — BUSPIRONE HYDROCHLORIDE 10 MG/1
10 TABLET ORAL 2 TIMES DAILY
Qty: 60 TAB | Refills: 0 | Status: CANCELLED | OUTPATIENT
Start: 2018-07-24

## 2018-07-24 RX ORDER — BUSPIRONE HYDROCHLORIDE 10 MG/1
10 TABLET ORAL 2 TIMES DAILY
Qty: 180 TAB | Refills: 0 | Status: SHIPPED | OUTPATIENT
Start: 2018-07-24 | End: 2018-08-14 | Stop reason: SDUPTHER

## 2018-07-24 RX ORDER — TIZANIDINE 4 MG/1
4 TABLET ORAL EVERY 12 HOURS PRN
Qty: 180 TAB | Refills: 0 | OUTPATIENT
Start: 2018-07-24

## 2018-08-14 ENCOUNTER — OFFICE VISIT (OUTPATIENT)
Dept: MEDICAL GROUP | Facility: PHYSICIAN GROUP | Age: 28
End: 2018-08-14
Payer: COMMERCIAL

## 2018-08-14 VITALS
TEMPERATURE: 97.4 F | SYSTOLIC BLOOD PRESSURE: 124 MMHG | HEART RATE: 83 BPM | HEIGHT: 62 IN | BODY MASS INDEX: 29.63 KG/M2 | DIASTOLIC BLOOD PRESSURE: 74 MMHG | OXYGEN SATURATION: 97 % | WEIGHT: 161 LBS

## 2018-08-14 DIAGNOSIS — G44.229 CHRONIC TENSION-TYPE HEADACHE, NOT INTRACTABLE: ICD-10-CM

## 2018-08-14 DIAGNOSIS — F41.1 GAD (GENERALIZED ANXIETY DISORDER): ICD-10-CM

## 2018-08-14 DIAGNOSIS — F41.0 PANIC ATTACK: ICD-10-CM

## 2018-08-14 PROBLEM — E78.5 DYSLIPIDEMIA: Status: RESOLVED | Noted: 2018-02-01 | Resolved: 2018-08-14

## 2018-08-14 PROCEDURE — 99213 OFFICE O/P EST LOW 20 MIN: CPT | Performed by: NURSE PRACTITIONER

## 2018-08-14 RX ORDER — BUSPIRONE HYDROCHLORIDE 10 MG/1
10 TABLET ORAL 2 TIMES DAILY
Qty: 180 TAB | Refills: 3 | Status: SHIPPED | OUTPATIENT
Start: 2018-08-14 | End: 2019-08-26 | Stop reason: SDUPTHER

## 2018-08-15 NOTE — ASSESSMENT & PLAN NOTE
Ongoing problem that continues to improve over the past couple of months. They have decreased in frequency and intensity by 75%. She has now actually gone a total of 2 months without aspirin, where before she was using it daily. She is using a muscle relaxer at bedtime as needed. Also continuing to do chiropractor and yoga with massage.

## 2018-08-15 NOTE — PROGRESS NOTES
Subjective:     Chief Complaint   Patient presents with   • Depression     Fv       HPI  Deysi Goodrich is a 28 y.o. female here today for f/u on anxiety med changes from 8 weeks ago     Headache  Ongoing problem that continues to improve over the past couple of months. They have decreased in frequency and intensity by 75%. She has now actually gone a total of 2 months without aspirin, where before she was using it daily. She is using a muscle relaxer at bedtime as needed. Also continuing to do chiropractor and yoga with massage.      Panic attacks  She has not had a bad panic attack for 2 months     FAUSTINO (generalized anxiety disorder)  Ongoing chronic problem that has significantly improved with the addition of buspirone. She used to feel like she would get stuck in a rabbit hole where one small thing would make her feel like she has to constantly worry and think about and focus on that one thing. She has now been on this new medication in addition to her fluoxetine for 2 months now and reports 75% improvement.    FAUSTINO 7: (down from 19)       Diagnoses of FAUSTINO (generalized anxiety disorder), Panic attacks, and Chronic tension-type headache, not intractable were pertinent to this visit.    Allergies: Nkda [no known drug allergy]  Current medicines (including changes today)  Current Outpatient Prescriptions   Medication Sig Dispense Refill   • busPIRone (BUSPAR) 10 MG Tab Take 1 Tab by mouth 2 times a day. 180 Tab 3   • tizanidine (ZANAFLEX) 4 MG Tab Take 1 Tab by mouth every 12 hours as needed (muscle stiffness). 180 Tab 0   • FLUoxetine (PROZAC) 20 MG Cap Take 1 Cap by mouth every day. 90 Cap 3   • Cyanocobalamin (VITAMIN B-12) 3000 MCG SL Tab Place  under tongue.     • Cholecalciferol (VITAMIN D-3) 5000 units Tab Take  by mouth.       No current facility-administered medications for this visit.        She  has a past medical history of Anxiety; Bleeding nose (chronic); Depression; Migraine ([id number]); Mild  "dysplasia of cervix (KAREL I) (3/16/2010); Palpitations (6/5/2012); PTSD (post-traumatic stress disorder); and Syncope (6/5/2012). She also has no past medical history of ASTHMA or Diabetes.        ROS  As stated in HPI     Objective:     Blood pressure 124/74, pulse 83, temperature 36.3 °C (97.4 °F), height 1.575 m (5' 2\"), weight 73 kg (161 lb), SpO2 97 %. Body mass index is 29.45 kg/m².  Physical Exam:  General: Alert, oriented, in no acute distress.  Eye contact is good, speech goal directed, affect calm  CNs grossly intact.  Gross hearing intact.  Gait steady.     Assessment and Plan:   Assessment/Plan:  1. FAUSTINO (generalized anxiety disorder)  Improved and stable. Continue fluoxetine 20 mg and buspirone 10 mg BID.   - busPIRone (BUSPAR) 10 MG Tab; Take 1 Tab by mouth 2 times a day.  Dispense: 180 Tab; Refill: 3    2. Panic attacks  Well controlled.     3. Chronic tension-type headache, not intractable  Improving and stable. Continue current management of chronic condition        Follow up:  Return in about 6 months (around 2/14/2019).    Educated in proper administration of medication(s) ordered today including safety, possible SE, risks, benefits, rationale and alternatives to therapy.   Supportive care, differential diagnoses, and indications for immediate follow-up discussed with patient.    Pathogenesis of diagnosis discussed including typical length and natural progression.    Instructed to return to clinic or nearest emergency department for any change in condition, further concerns, or worsening of symptoms.  Patient states understanding of the plan of care and discharge instructions.      Please note that this dictation was created using voice recognition software. I have made every reasonable attempt to correct obvious errors, but I expect that there are errors of grammar and possibly content that I did not discover before finalizing the note.    Followup: Return in about 6 months (around 2/14/2019). " sooner should new symptoms or problems arise.

## 2018-08-15 NOTE — ASSESSMENT & PLAN NOTE
Ongoing chronic problem that has significantly improved with the addition of buspirone. She used to feel like she would get stuck in a rabbit hole where one small thing would make her feel like she has to constantly worry and think about and focus on that one thing. She has now been on this new medication in addition to her fluoxetine for 2 months now and reports 75% improvement.    FAUSTINO 7: (down from 19)

## 2018-11-27 DIAGNOSIS — R51.9 UNILATERAL OCCIPITAL HEADACHE: ICD-10-CM

## 2018-11-28 RX ORDER — TIZANIDINE 4 MG/1
TABLET ORAL
Qty: 180 TAB | Refills: 3 | Status: SHIPPED | OUTPATIENT
Start: 2018-11-28 | End: 2019-08-26 | Stop reason: SDUPTHER

## 2019-01-16 ENCOUNTER — APPOINTMENT (OUTPATIENT)
Dept: RADIOLOGY | Facility: MEDICAL CENTER | Age: 29
End: 2019-01-16
Attending: EMERGENCY MEDICINE
Payer: COMMERCIAL

## 2019-01-16 ENCOUNTER — HOSPITAL ENCOUNTER (EMERGENCY)
Facility: MEDICAL CENTER | Age: 29
End: 2019-01-16
Attending: EMERGENCY MEDICINE
Payer: COMMERCIAL

## 2019-01-16 VITALS
RESPIRATION RATE: 16 BRPM | SYSTOLIC BLOOD PRESSURE: 141 MMHG | OXYGEN SATURATION: 95 % | WEIGHT: 167.77 LBS | HEIGHT: 62 IN | DIASTOLIC BLOOD PRESSURE: 94 MMHG | TEMPERATURE: 97.6 F | HEART RATE: 73 BPM | BODY MASS INDEX: 30.87 KG/M2

## 2019-01-16 DIAGNOSIS — R10.31 ABDOMINAL PAIN, RLQ: ICD-10-CM

## 2019-01-16 LAB
ALBUMIN SERPL BCP-MCNC: 4.4 G/DL (ref 3.2–4.9)
ALBUMIN/GLOB SERPL: 1.5 G/DL
ALP SERPL-CCNC: 47 U/L (ref 30–99)
ALT SERPL-CCNC: 22 U/L (ref 2–50)
ANION GAP SERPL CALC-SCNC: 8 MMOL/L (ref 0–11.9)
APPEARANCE UR: CLEAR
AST SERPL-CCNC: 17 U/L (ref 12–45)
BASOPHILS # BLD AUTO: 0.6 % (ref 0–1.8)
BASOPHILS # BLD: 0.04 K/UL (ref 0–0.12)
BILIRUB SERPL-MCNC: 0.5 MG/DL (ref 0.1–1.5)
BILIRUB UR QL STRIP.AUTO: NEGATIVE
BUN SERPL-MCNC: 17 MG/DL (ref 8–22)
CALCIUM SERPL-MCNC: 9.6 MG/DL (ref 8.5–10.5)
CHLORIDE SERPL-SCNC: 107 MMOL/L (ref 96–112)
CO2 SERPL-SCNC: 22 MMOL/L (ref 20–33)
COLOR UR: YELLOW
CREAT SERPL-MCNC: 0.78 MG/DL (ref 0.5–1.4)
EOSINOPHIL # BLD AUTO: 0.08 K/UL (ref 0–0.51)
EOSINOPHIL NFR BLD: 1.1 % (ref 0–6.9)
ERYTHROCYTE [DISTWIDTH] IN BLOOD BY AUTOMATED COUNT: 42.4 FL (ref 35.9–50)
GLOBULIN SER CALC-MCNC: 3 G/DL (ref 1.9–3.5)
GLUCOSE SERPL-MCNC: 88 MG/DL (ref 65–99)
GLUCOSE UR STRIP.AUTO-MCNC: NEGATIVE MG/DL
HCG SERPL QL: NEGATIVE
HCT VFR BLD AUTO: 40.3 % (ref 37–47)
HGB BLD-MCNC: 13.9 G/DL (ref 12–16)
IMM GRANULOCYTES # BLD AUTO: 0.02 K/UL (ref 0–0.11)
IMM GRANULOCYTES NFR BLD AUTO: 0.3 % (ref 0–0.9)
KETONES UR STRIP.AUTO-MCNC: ABNORMAL MG/DL
LEUKOCYTE ESTERASE UR QL STRIP.AUTO: NEGATIVE
LIPASE SERPL-CCNC: 16 U/L (ref 11–82)
LYMPHOCYTES # BLD AUTO: 1.97 K/UL (ref 1–4.8)
LYMPHOCYTES NFR BLD: 27.2 % (ref 22–41)
MCH RBC QN AUTO: 30.9 PG (ref 27–33)
MCHC RBC AUTO-ENTMCNC: 34.5 G/DL (ref 33.6–35)
MCV RBC AUTO: 89.6 FL (ref 81.4–97.8)
MICRO URNS: ABNORMAL
MONOCYTES # BLD AUTO: 0.54 K/UL (ref 0–0.85)
MONOCYTES NFR BLD AUTO: 7.4 % (ref 0–13.4)
NEUTROPHILS # BLD AUTO: 4.6 K/UL (ref 2–7.15)
NEUTROPHILS NFR BLD: 63.4 % (ref 44–72)
NITRITE UR QL STRIP.AUTO: NEGATIVE
NRBC # BLD AUTO: 0 K/UL
NRBC BLD-RTO: 0 /100 WBC
PH UR STRIP.AUTO: 6.5 [PH]
PLATELET # BLD AUTO: 203 K/UL (ref 164–446)
PMV BLD AUTO: 10.7 FL (ref 9–12.9)
POTASSIUM SERPL-SCNC: 3.8 MMOL/L (ref 3.6–5.5)
PROT SERPL-MCNC: 7.4 G/DL (ref 6–8.2)
PROT UR QL STRIP: NEGATIVE MG/DL
RBC # BLD AUTO: 4.5 M/UL (ref 4.2–5.4)
RBC UR QL AUTO: NEGATIVE
SODIUM SERPL-SCNC: 137 MMOL/L (ref 135–145)
SP GR UR STRIP.AUTO: 1.02
UROBILINOGEN UR STRIP.AUTO-MCNC: 0.2 MG/DL
WBC # BLD AUTO: 7.3 K/UL (ref 4.8–10.8)

## 2019-01-16 PROCEDURE — 76705 ECHO EXAM OF ABDOMEN: CPT

## 2019-01-16 PROCEDURE — 84703 CHORIONIC GONADOTROPIN ASSAY: CPT

## 2019-01-16 PROCEDURE — 81003 URINALYSIS AUTO W/O SCOPE: CPT

## 2019-01-16 PROCEDURE — 99284 EMERGENCY DEPT VISIT MOD MDM: CPT

## 2019-01-16 PROCEDURE — 76775 US EXAM ABDO BACK WALL LIM: CPT

## 2019-01-16 PROCEDURE — 85025 COMPLETE CBC W/AUTO DIFF WBC: CPT

## 2019-01-16 PROCEDURE — 80053 COMPREHEN METABOLIC PANEL: CPT

## 2019-01-16 PROCEDURE — 83690 ASSAY OF LIPASE: CPT

## 2019-01-16 ASSESSMENT — PAIN SCALES - GENERAL: PAINLEVEL_OUTOF10: 3

## 2019-01-16 NOTE — ED PROVIDER NOTES
ED Provider Note    CHIEF COMPLAINT  Chief Complaint   Patient presents with   • Abdominal Pain     right sided   • Diarrhea   • Nausea       HPI  Deysi Goodrich is a 28 y.o. female who presents abdominal pain on her right lateral abdominal wall onset yesterday.  Pain is constant at 3-4 out of 10 with severe exacerbations to 7 or 8 out of 10 just a few seconds.  No clear trigger or modifying factor.  She has had nausea but no vomiting.  She has had 2-3 episodes of watery nonbloody nonmucoid diarrhea.  No recent antibiotics.  No past surgical history.  Denies dysuria urgency frequency hematuria kidney stones or rash.  She is never had shingles.  No diabetes or immune compromise.  No pregnancy.    REVIEW OF SYSTEMS  Pertinent positives include: Abdominal pain, nausea.  Pertinent negatives include: Peptic ulcer disease, pancreatitis, gastritis, diverticulitis, ovarian cyst, endometriosis, pelvic infections, sore throat, cough, headache.  10+ systems reviewed and negative.      PAST MEDICAL HISTORY  Past Medical History:   Diagnosis Date   • Anxiety    • Bleeding nose chronic   • Depression    • Migraine [id number]   • Mild dysplasia of cervix (KAREL I) 3/16/2010   • Palpitations 6/5/2012   • PTSD (post-traumatic stress disorder)    • Syncope 6/5/2012       FAMILY HISTORY  Family History   Problem Relation Age of Onset   • Cancer Mother         Cervical   • Depression Mother    • Anxiety disorder Mother    • Alcohol/Drug Mother    • Hypertension Father    • Anxiety disorder Father    • Heart Disease Father    • Alcohol abuse Father    • Hyperlipidemia Father    • Alcohol/Drug Maternal Uncle    • Anxiety disorder Paternal Aunt    • Depression Paternal Aunt    • Colon Cancer Paternal Aunt 58   • Alcohol abuse Paternal Uncle    • Depression Maternal Grandmother    • Anxiety disorder Maternal Grandmother    • Heart Attack Maternal Grandfather    • Alcohol abuse Maternal Grandfather    • Depression Paternal Grandmother    •  "Anxiety disorder Paternal Grandmother    • Alcohol abuse Paternal Grandmother    • Diabetes Paternal Grandfather    • Hypertension Paternal Grandfather    • Alcohol abuse Paternal Grandfather    • Anxiety disorder Paternal Aunt    • Depression Paternal Aunt    • Alcohol abuse Paternal Aunt    • Anxiety disorder Paternal Aunt    • Depression Paternal Aunt    • Alcohol/Drug Maternal Uncle    • Stroke Neg Hx        SOCIAL HISTORY  Social History   Substance Use Topics   • Smoking status: Former Smoker     Years: 3.00     Types: Cigarettes     Quit date: 11/1/2009   • Smokeless tobacco: Never Used   • Alcohol use 1.2 oz/week     2 Shots of liquor per week      Comment: occ     History   Drug Use No       SURGICAL HISTORY  Denies abdominal surgery    CURRENT MEDICATIONS  No current facility-administered medications for this encounter.      Current Outpatient Prescriptions   Medication Sig Dispense Refill   • tizanidine (ZANAFLEX) 4 MG Tab TAKE 1 TABLET BY MOUTH EVERY 12 HOURS AS NEEDED FOR MUSCLE STIFFNESS 180 Tab 3   • busPIRone (BUSPAR) 10 MG Tab Take 1 Tab by mouth 2 times a day. 180 Tab 3   • FLUoxetine (PROZAC) 20 MG Cap Take 1 Cap by mouth every day. 90 Cap 3   • Cyanocobalamin (VITAMIN B-12) 3000 MCG SL Tab Place  under tongue.     • Cholecalciferol (VITAMIN D-3) 5000 units Tab Take  by mouth.         ALLERGIES  Allergies   Allergen Reactions   • Nkda [No Known Drug Allergy]        PHYSICAL EXAM  VITAL SIGNS: /94   Pulse 98   Temp 36.5 °C (97.7 °F) (Temporal)   Resp 17   Ht 1.575 m (5' 2\")   Wt 76.1 kg (167 lb 12.3 oz)   LMP 12/26/2018   SpO2 94%   BMI 30.69 kg/m²   Reviewed and elevated blood pressure  Constitutional: Well developed, Well nourished, well-appearing, mildly overweight.  HENT: Normocephalic, atraumatic, bilateral external ears normal, oropharynx moist, No exudates or erythema.   Eyes: PERRLA, conjunctiva pink, no scleral icterus.   Cardiovascular: Regular S1-S2 without murmur, rub, " gallop.  Respiratory: No rales, rhonchi, wheeze.  Gastrointestinal: Soft, mild right lateral lower quadrant tenderness without rebound guarding or masses.  Bowel sounds normal.  Mild right upper quadrant tenderness..  Skin: No erythema, no rash.   Genitourinary:  No costovertebral angle tenderness.   Neurologic: Alert & oriented x 3, cranial nerves 2-12 intact by passive exam.  No focal deficit noted.  Psychiatric: Affect normal, Judgment normal, Mood normal.     DIFFERENTIAL DIAGNOSIS:  Shingles, renal colic, pyelonephritis, appendicitis, viral syndrome, musculoskeletal pain.    RADIOLOGY/PROCEDURES  US-APPENDIX   Final Result         1. Nonvisualization of the appendix.      US-RENAL   Final Result      Normal renal ultrasound.          LABORATORY:  Results for orders placed or performed during the hospital encounter of 01/16/19   CBC WITH DIFFERENTIAL   Result Value Ref Range    WBC 7.3 4.8 - 10.8 K/uL    RBC 4.50 4.20 - 5.40 M/uL    Hemoglobin 13.9 12.0 - 16.0 g/dL    Hematocrit 40.3 37.0 - 47.0 %    MCV 89.6 81.4 - 97.8 fL    MCH 30.9 27.0 - 33.0 pg    MCHC 34.5 33.6 - 35.0 g/dL    RDW 42.4 35.9 - 50.0 fL    Platelet Count 203 164 - 446 K/uL    MPV 10.7 9.0 - 12.9 fL    Neutrophils-Polys 63.40 44.00 - 72.00 %    Lymphocytes 27.20 22.00 - 41.00 %    Monocytes 7.40 0.00 - 13.40 %    Eosinophils 1.10 0.00 - 6.90 %    Basophils 0.60 0.00 - 1.80 %    Immature Granulocytes 0.30 0.00 - 0.90 %    Nucleated RBC 0.00 /100 WBC    Neutrophils (Absolute) 4.60 2.00 - 7.15 K/uL    Lymphs (Absolute) 1.97 1.00 - 4.80 K/uL    Monos (Absolute) 0.54 0.00 - 0.85 K/uL    Eos (Absolute) 0.08 0.00 - 0.51 K/uL    Baso (Absolute) 0.04 0.00 - 0.12 K/uL    Immature Granulocytes (abs) 0.02 0.00 - 0.11 K/uL    NRBC (Absolute) 0.00 K/uL   COMP METABOLIC PANEL   Result Value Ref Range    Sodium 137 135 - 145 mmol/L    Potassium 3.8 3.6 - 5.5 mmol/L    Chloride 107 96 - 112 mmol/L    Co2 22 20 - 33 mmol/L    Anion Gap 8.0 0.0 - 11.9    Glucose  88 65 - 99 mg/dL    Bun 17 8 - 22 mg/dL    Creatinine 0.78 0.50 - 1.40 mg/dL    Calcium 9.6 8.5 - 10.5 mg/dL    AST(SGOT) 17 12 - 45 U/L    ALT(SGPT) 22 2 - 50 U/L    Alkaline Phosphatase 47 30 - 99 U/L    Total Bilirubin 0.5 0.1 - 1.5 mg/dL    Albumin 4.4 3.2 - 4.9 g/dL    Total Protein 7.4 6.0 - 8.2 g/dL    Globulin 3.0 1.9 - 3.5 g/dL    A-G Ratio 1.5 g/dL   LIPASE   Result Value Ref Range    Lipase 16 11 - 82 U/L   URINALYSIS,CULTURE IF INDICATED   Result Value Ref Range    Color Yellow     Character Clear     Specific Gravity 1.025 <1.035    Ph 6.5 5.0 - 8.0    Glucose Negative Negative mg/dL    Ketones Trace (A) Negative mg/dL    Protein Negative Negative mg/dL    Bilirubin Negative Negative    Urobilinogen, Urine 0.2 Negative    Nitrite Negative Negative    Leukocyte Esterase Negative Negative    Occult Blood Negative Negative    Micro Urine Req see below    HCG QUAL SERUM   Result Value Ref Range    Beta-Hcg Qualitative Serum Negative Negative       INTERVENTIONS:  Refused analgesics and antiemetics    Repeat exam mild right lower quadrant and right lateral abdominal wall tenderness.  Pain patient able to jump up and down without exacerbation of symptoms    COURSE & MEDICAL DECISION MAKING  This patient presents with right lower quadrant abdominal pain of unclear etiology.  Given absence of fever absence of leukocytosis absence of vomiting and absence of peritoneal signs appendicitis is unlikely.  Gallbladder ultrasound was negative one year ago for gallstones or other abnormalities.  CT scan abdomen and pelvis 2 years ago was unremarkable.  This point I do not believe CT imaging would .  There is no pregnancy UTI or kidney stone.    This patient has borderline or elevated blood pressure as recorded above and was instructed to followup with primary physician for comprehensive blood pressure evaluation and yearly fasting cholesterol assessment according to to CMS protocol.    PLAN:  Ibuprofen  and Tylenol for pain  Clear fluid diet 12 hours    Abdominal pain women handout given  Return for reevaluation in 12-24 hours and sooner for signs of appendicitis    This department 12-24 hours    CONDITION: Stable.    FINAL IMPRESSION  1. Abdominal pain, RLQ          Electronically signed by: Melecio Good, 1/16/2019 3:38 PM

## 2019-01-16 NOTE — ED TRIAGE NOTES
Amb to triage w/ c/o R sided abd pain radiating to mid abd, nausea, diarrhea & bloating since yesterday.  PWD.  No distress noted.

## 2019-01-17 NOTE — DISCHARGE INSTRUCTIONS
Take a clear fluid diet for 12 hours.  Take ibuprofen and Tylenol for pain.  Return if not better in 12-24 hours.  Return sooner for worsening pain is worse with movement and associated with fever loss of appetite and/or vomiting.

## 2019-01-17 NOTE — ED NOTES
Pt reports right sided abdominal pain that radiates to umbilicus x 1 day.  Pt reports nausea and diarrhea.

## 2019-01-17 NOTE — ED NOTES
Patient discharged with instructions for d RLQ abd pain with prescriptions x0 signs and symptoms explained to patient for reasons to return to emergency department, Patient is understanding and has no further questions. Pt ambulatory to the lobby with a steady gait.

## 2019-02-08 ENCOUNTER — PATIENT MESSAGE (OUTPATIENT)
Dept: MEDICAL GROUP | Facility: PHYSICIAN GROUP | Age: 29
End: 2019-02-08

## 2019-02-08 DIAGNOSIS — Z11.3 SCREEN FOR STD (SEXUALLY TRANSMITTED DISEASE): ICD-10-CM

## 2019-02-16 ENCOUNTER — HOSPITAL ENCOUNTER (OUTPATIENT)
Dept: LAB | Facility: MEDICAL CENTER | Age: 29
End: 2019-02-16
Attending: NURSE PRACTITIONER
Payer: COMMERCIAL

## 2019-02-16 DIAGNOSIS — Z11.3 SCREEN FOR STD (SEXUALLY TRANSMITTED DISEASE): ICD-10-CM

## 2019-02-16 PROCEDURE — 87591 N.GONORRHOEAE DNA AMP PROB: CPT

## 2019-02-16 PROCEDURE — 87491 CHLMYD TRACH DNA AMP PROBE: CPT

## 2019-03-20 ENCOUNTER — OFFICE VISIT (OUTPATIENT)
Dept: URGENT CARE | Facility: PHYSICIAN GROUP | Age: 29
End: 2019-03-20
Payer: COMMERCIAL

## 2019-03-20 VITALS
OXYGEN SATURATION: 97 % | SYSTOLIC BLOOD PRESSURE: 114 MMHG | TEMPERATURE: 99.7 F | HEART RATE: 71 BPM | WEIGHT: 161 LBS | HEIGHT: 62 IN | DIASTOLIC BLOOD PRESSURE: 78 MMHG | RESPIRATION RATE: 18 BRPM | BODY MASS INDEX: 29.63 KG/M2

## 2019-03-20 DIAGNOSIS — J98.8 RTI (RESPIRATORY TRACT INFECTION): Primary | ICD-10-CM

## 2019-03-20 DIAGNOSIS — R50.9 FEVER, UNSPECIFIED FEVER CAUSE: ICD-10-CM

## 2019-03-20 DIAGNOSIS — R05.9 COUGH: ICD-10-CM

## 2019-03-20 PROCEDURE — 99214 OFFICE O/P EST MOD 30 MIN: CPT | Performed by: NURSE PRACTITIONER

## 2019-03-20 RX ORDER — BENZONATATE 200 MG/1
200 CAPSULE ORAL EVERY 8 HOURS PRN
Qty: 21 CAP | Refills: 0 | Status: SHIPPED | OUTPATIENT
Start: 2019-03-20 | End: 2019-08-26

## 2019-03-20 RX ORDER — AZITHROMYCIN 250 MG/1
TABLET, FILM COATED ORAL
Qty: 6 TAB | Refills: 0 | Status: SHIPPED | OUTPATIENT
Start: 2019-03-20 | End: 2019-03-25

## 2019-03-20 ASSESSMENT — ENCOUNTER SYMPTOMS
CARDIOVASCULAR NEGATIVE: 1
FEVER: 1
HEADACHES: 1
SPUTUM PRODUCTION: 1
SHORTNESS OF BREATH: 0
SORE THROAT: 1
COUGH: 1

## 2019-03-20 NOTE — LETTER
March 20, 2019         Patient: Deysi Goodrich   YOB: 1990   Date of Visit: 3/20/2019           To Whom it May Concern:    Deysi Goodrich was seen in my clinic on 3/20/2019. She may return to work 3/23/2019 or sooner if she is feeling better.    If you have any questions or concerns, please don't hesitate to call.        Sincerely,           RICARDO Cisneros.  Electronically Signed

## 2019-04-18 ENCOUNTER — HOSPITAL ENCOUNTER (OUTPATIENT)
Dept: LAB | Facility: MEDICAL CENTER | Age: 29
End: 2019-04-18
Attending: OBSTETRICS & GYNECOLOGY
Payer: COMMERCIAL

## 2019-04-18 PROCEDURE — 87591 N.GONORRHOEAE DNA AMP PROB: CPT

## 2019-04-18 PROCEDURE — 87491 CHLMYD TRACH DNA AMP PROBE: CPT

## 2019-04-18 PROCEDURE — 88175 CYTOPATH C/V AUTO FLUID REDO: CPT

## 2019-04-18 PROCEDURE — 87624 HPV HI-RISK TYP POOLED RSLT: CPT

## 2019-04-20 LAB
C TRACH DNA GENITAL QL NAA+PROBE: NEGATIVE
CYTOLOGY REG CYTOL: NORMAL
HPV HR 12 DNA CVX QL NAA+PROBE: NEGATIVE
HPV16 DNA SPEC QL NAA+PROBE: NEGATIVE
HPV18 DNA SPEC QL NAA+PROBE: NEGATIVE
N GONORRHOEA DNA GENITAL QL NAA+PROBE: NEGATIVE
SPECIMEN SOURCE: NORMAL
SPECIMEN SOURCE: NORMAL

## 2019-05-11 ENCOUNTER — HOSPITAL ENCOUNTER (OUTPATIENT)
Dept: LAB | Facility: MEDICAL CENTER | Age: 29
End: 2019-05-11
Attending: OBSTETRICS & GYNECOLOGY
Payer: COMMERCIAL

## 2019-05-11 LAB
HBV SURFACE AB SERPL IA-ACNC: 70.36 MIU/ML (ref 0–10)
HBV SURFACE AG SER QL: NEGATIVE
HCV AB SER QL: NEGATIVE
HETEROPH AB SER QL: NEGATIVE
HIV 1+2 AB+HIV1 P24 AG SERPL QL IA: NON REACTIVE
TREPONEMA PALLIDUM IGG+IGM AB [PRESENCE] IN SERUM OR PLASMA BY IMMUNOASSAY: NON REACTIVE

## 2019-05-11 PROCEDURE — 87340 HEPATITIS B SURFACE AG IA: CPT

## 2019-05-11 PROCEDURE — 86803 HEPATITIS C AB TEST: CPT

## 2019-05-11 PROCEDURE — 87389 HIV-1 AG W/HIV-1&-2 AB AG IA: CPT

## 2019-05-11 PROCEDURE — 86780 TREPONEMA PALLIDUM: CPT

## 2019-05-11 PROCEDURE — 86694 HERPES SIMPLEX NES ANTBDY: CPT

## 2019-05-11 PROCEDURE — 86308 HETEROPHILE ANTIBODY SCREEN: CPT

## 2019-05-11 PROCEDURE — 86706 HEP B SURFACE ANTIBODY: CPT

## 2019-05-11 PROCEDURE — 36415 COLL VENOUS BLD VENIPUNCTURE: CPT

## 2019-05-13 LAB — HSV1+2 IGG SER IA-ACNC: 1.02 IV

## 2019-08-26 ENCOUNTER — OFFICE VISIT (OUTPATIENT)
Dept: MEDICAL GROUP | Facility: PHYSICIAN GROUP | Age: 29
End: 2019-08-26
Payer: COMMERCIAL

## 2019-08-26 VITALS
OXYGEN SATURATION: 96 % | BODY MASS INDEX: 29.81 KG/M2 | SYSTOLIC BLOOD PRESSURE: 128 MMHG | TEMPERATURE: 97.6 F | DIASTOLIC BLOOD PRESSURE: 90 MMHG | WEIGHT: 162 LBS | HEART RATE: 91 BPM | HEIGHT: 62 IN

## 2019-08-26 DIAGNOSIS — F33.1 MODERATE EPISODE OF RECURRENT MAJOR DEPRESSIVE DISORDER (HCC): ICD-10-CM

## 2019-08-26 DIAGNOSIS — F41.1 GAD (GENERALIZED ANXIETY DISORDER): ICD-10-CM

## 2019-08-26 DIAGNOSIS — E66.3 OVERWEIGHT (BMI 25.0-29.9): ICD-10-CM

## 2019-08-26 DIAGNOSIS — E55.9 VITAMIN D INSUFFICIENCY: ICD-10-CM

## 2019-08-26 DIAGNOSIS — Z23 NEED FOR VACCINATION: ICD-10-CM

## 2019-08-26 DIAGNOSIS — G44.229 CHRONIC TENSION-TYPE HEADACHE, NOT INTRACTABLE: ICD-10-CM

## 2019-08-26 PROCEDURE — 90471 IMMUNIZATION ADMIN: CPT | Performed by: NURSE PRACTITIONER

## 2019-08-26 PROCEDURE — 90715 TDAP VACCINE 7 YRS/> IM: CPT | Performed by: NURSE PRACTITIONER

## 2019-08-26 PROCEDURE — 99214 OFFICE O/P EST MOD 30 MIN: CPT | Mod: 25 | Performed by: NURSE PRACTITIONER

## 2019-08-26 RX ORDER — BUSPIRONE HYDROCHLORIDE 10 MG/1
10 TABLET ORAL 2 TIMES DAILY
Qty: 180 TAB | Refills: 3 | Status: SHIPPED | OUTPATIENT
Start: 2019-08-26 | End: 2020-09-21

## 2019-08-26 RX ORDER — TIZANIDINE 4 MG/1
TABLET ORAL
Qty: 180 TAB | Refills: 3 | Status: SHIPPED | OUTPATIENT
Start: 2019-08-26 | End: 2020-09-21

## 2019-08-26 RX ORDER — FLUOXETINE HYDROCHLORIDE 40 MG/1
40 CAPSULE ORAL DAILY
Qty: 90 CAP | Refills: 0 | Status: SHIPPED | OUTPATIENT
Start: 2019-08-26 | End: 2019-12-18

## 2019-08-26 ASSESSMENT — PATIENT HEALTH QUESTIONNAIRE - PHQ9
3. TROUBLE FALLING OR STAYING ASLEEP OR SLEEPING TOO MUCH: SEVERAL DAYS
SUM OF ALL RESPONSES TO PHQ QUESTIONS 1-9: 6
5. POOR APPETITE OR OVEREATING: SEVERAL DAYS
9. THOUGHTS THAT YOU WOULD BE BETTER OFF DEAD, OR OF HURTING YOURSELF: NOT AT ALL
6. FEELING BAD ABOUT YOURSELF - OR THAT YOU ARE A FAILURE OR HAVE LET YOURSELF OR YOUR FAMILY DOWN: SEVERAL DAYS
1. LITTLE INTEREST OR PLEASURE IN DOING THINGS: NOT AT ALL
8. MOVING OR SPEAKING SO SLOWLY THAT OTHER PEOPLE COULD HAVE NOTICED. OR THE OPPOSITE, BEING SO FIGETY OR RESTLESS THAT YOU HAVE BEEN MOVING AROUND A LOT MORE THAN USUAL: NOT AT ALL
7. TROUBLE CONCENTRATING ON THINGS, SUCH AS READING THE NEWSPAPER OR WATCHING TELEVISION: NOT AT ALL
4. FEELING TIRED OR HAVING LITTLE ENERGY: SEVERAL DAYS
SUM OF ALL RESPONSES TO PHQ9 QUESTIONS 1 AND 2: 2
2. FEELING DOWN, DEPRESSED, IRRITABLE, OR HOPELESS: MORE THAN HALF THE DAYS

## 2019-08-27 PROBLEM — E66.3 OVERWEIGHT (BMI 25.0-29.9): Status: ACTIVE | Noted: 2018-02-01

## 2019-08-27 NOTE — PROGRESS NOTES
CC:   Chief Complaint   Patient presents with   • Establish Care   • Medication Management     HISTORY OF THE PRESENT ILLNESS: Patient is a 29 y.o. female. This pleasant patient is here today to establish care and discuss multiple issues as listed below.    Health Maintenance: Completed  Advised flu shot will be available early September, encouraged MA or office visit to receive flu injection for this season.    Due for Tdap, given today.    Major depressive disorder  This is a new problem to the examiner. Patient reports that she has experienced depression, anxiety, and PTSD for a few years.  The patient has been on fluoxetine for a couple of years at 20 mg/day.  The patient states that her dose was increased to 40 mg/day, however she was not on that dose very long before returning to 20 mg/day and adding BuSpar.  Patient states that she did not have any side effects from the increased dose.  Reports that the combination of the fluoxetine and the buspirone is helping to stabilize her mood, and decrease her anger, and less reactive, and more motivated to get out of bed.  The patient states that her anxiety has not improved.  Patient continues to go to therapy every 2 weeks with Dr. Leonora Teran.  Reports that she is working with a therapist to help decrease her anxiety by managing her compulsive behaviors.  Patient states that in every situation she is faced with she immediately goes to the worst case scenario in her mind.  Unless she is able to immediately address the issue she will focus on that problem the entire day.  Example, if she leaves the coffee machine plugged and it will bring down her house unless she goes home and unplugs it.  The patient's PHQ 9 score in March 2018 was 14, today it is 6.  The patient is not opposed to increasing fluoxetine dose again. The patient denies any suicidal ideations or thoughts of harming herself or others.  Depression Screen (PHQ-2/PHQ-9) 3/29/2018 3/29/2018 8/26/2019  "  PHQ-2 Total Score - 4 -   PHQ-2 Total Score - - 2   PHQ-2 Total Score 4 - -   PHQ-9 Total Score - 14 -   PHQ-9 Total Score - - 6   PHQ-9 Total Score 15 - -   Interpretation of PHQ-9 Total Score   Score Severity   5-9 Mild Depression     Headache  This is a new problem to the examiner. This is a problem for the patient that has been improving over the last year.  The patient states that her symptoms will range between headaches, severe headaches, or migraines.  The patient has been attending massage therapy and chiropractor 1 time per month as well as using tizanidine to help relax which has significantly decreased her symptoms.    Vitamin D insufficiency  This is a new problem to the examiner.  Chronic, ongoing.  Continues to take OTC vitamin d supplement daily.    9/30/2016 09:05 2/15/2018 09:15   25-Hydroxy   Vitamin D 25 29 (L) 12 (L)       Overweight (BMI 25.0-29.9)  This is a new problem to the examiner.  Patient does not exercise or monitor diet closely.  Vitals 8/14/2018 1/16/2019 3/20/2019 8/26/2019   WEIGHT 161 167.77 161 162   HEIGHT 5' 2\" 5' 2\" 5' 2\" 5' 2\"   BMI 29.45 kg/m2 30.69 kg/m2 29.45 kg/m2 29.63 kg/m2       FAUSTINO (generalized anxiety disorder)  This is a new problem to the examiner.  This is an ongoing chronic problem for the patient that has significantly improved with the combination of fluoxetine 20 mg a day and buspirone 10 mg twice daily.  Patient does continue to feel like she \"gets stuck in a rabbit hole where one thing would make her feel like she has to constantly worry and think about and focus on that one thing\".  The patient is following with a psychiatrist for therapy and they are working on managing and controlling her obsessive-compulsive thoughts.  Patient uses as an example, if she is at work and her boss is in her office for a long time, the patient will immediately think that her boss is planning to fire her.  Or, if she leaves the house and forgets to unplug the coffee pot, she " immediately thinks and focuses on the fact that the coffee pot is going to burn her house down and kill her dogs.  She is unable to let go of these obsessive thoughts until she physically addresses the aggravating issue.  Patient states that her therapist is working with her to learn how to cope with not immediate gratification of fixing the problems, the patient states that the uncomfortableness is still present in her mind.  Patient states that the fluoxetine and buspirone have helped to manage her anger and quick to react temperament, but she is still plagued with the obsessive thoughts.  Patient states that she is paranoid, and the way that she automatically thinks that the worst thing will happen in every situation she is faced with.  Patient states that she does not hear voices or see people.    Allergies: Nkda [no known drug allergy]  Current Outpatient Medications Ordered in Epic   Medication Sig Dispense Refill   • tizanidine (ZANAFLEX) 4 MG Tab TAKE 1 TABLET BY MOUTH EVERY 12 HOURS AS NEEDED FOR MUSCLE STIFFNESS 180 Tab 3   • busPIRone (BUSPAR) 10 MG Tab tablet Take 1 Tab by mouth 2 times a day. 180 Tab 3   • FLUoxetine (PROZAC) 40 MG capsule Take 1 Cap by mouth every day. 90 Cap 0   • Cyanocobalamin (VITAMIN B-12) 3000 MCG SL Tab Place  under tongue.     • Cholecalciferol (VITAMIN D-3) 5000 units Tab Take  by mouth.       No current Epic-ordered facility-administered medications on file.      Past Medical History:   Diagnosis Date   • Anxiety    • Bleeding nose chronic   • Depression    • Migraine [id number]   • Mild dysplasia of cervix (KAREL I) 3/16/2010   • Palpitations 6/5/2012   • PTSD (post-traumatic stress disorder)    • Syncope 6/5/2012     Past Surgical History:   Procedure Laterality Date   • DENTAL EXTRACTION(S)  2006    one wisdom tooth     Social History     Tobacco Use   • Smoking status: Former Smoker     Packs/day: 1.00     Years: 3.00     Pack years: 3.00     Types: Cigarettes     Last  attempt to quit: 2009     Years since quittin.8   • Smokeless tobacco: Never Used   Substance Use Topics   • Alcohol use: Yes     Alcohol/week: 1.2 oz     Types: 2 Shots of liquor per week     Comment: occ   • Drug use: No     Social History     Social History Narrative   • Not on file     Family History   Problem Relation Age of Onset   • Cancer Mother         Cervical   • Depression Mother    • Anxiety disorder Mother    • Alcohol/Drug Mother    • Hypertension Father    • Anxiety disorder Father    • Heart Disease Father    • Alcohol abuse Father    • Hyperlipidemia Father    • No Known Problems Sister    • Alcohol/Drug Maternal Uncle    • Anxiety disorder Paternal Aunt    • Depression Paternal Aunt    • Colon Cancer Paternal Aunt 58   • Alcohol abuse Paternal Uncle    • Depression Maternal Grandmother    • Anxiety disorder Maternal Grandmother    • Heart Attack Maternal Grandfather    • Alcohol abuse Maternal Grandfather    • Depression Paternal Grandmother    • Anxiety disorder Paternal Grandmother    • Alcohol abuse Paternal Grandmother    • Diabetes Paternal Grandfather    • Hypertension Paternal Grandfather    • Alcohol abuse Paternal Grandfather    • Anxiety disorder Paternal Aunt    • Depression Paternal Aunt    • Alcohol abuse Paternal Aunt    • Anxiety disorder Paternal Aunt    • Depression Paternal Aunt    • Alcohol/Drug Maternal Uncle    • Stroke Neg Hx      ROS:   Constitutional:  Negative for fever, chills, unexpected weight change, night sweats, body aches, sleep issues, and fatigue/generalized weakness.   HEENT: Positive for headaches/migraines.  Negative for vision changes, hearing changes, ear pain, tinnitus, ear discharge, rhinorrhea, sinus congestion, sneezing, sore throat, and neck pain.    Respiratory:  Negative for cough, shortness of breath, sputum production, hemoptysis, chest congestion, dyspnea, wheezing, and crackles.    Cardiovascular:  Negative for chest pain, palpitations,  "ROD, paroxsymal nocturnal dyspnea, orthopnea, and bilateral lower extremity edema.   Gastrointestinal:  Negative for heartburn, nausea, vomiting, abdominal pain, hematochezia, melena, diarrhea, constipation, and greasy/foul-smelling stools.   Musculoskeletal:  Negative for myalgias, back pain, and joint pain.   Neurological: Positive for headaches/migraines. Negative for dizziness, tingling, tremors, focal sensory deficit, focal weakness.   Psychiatric/Behavioral: Positive for depression, anxiety, PTSD, obsessiveness, occasional panic attacks.  Negative for suicidal/homicidal ideation and memory loss.        Exam: /90 (BP Location: Left arm, Patient Position: Sitting, BP Cuff Size: Adult)   Pulse 91   Temp 36.4 °C (97.6 °F) (Temporal)   Ht 1.575 m (5' 2\")   Wt 73.5 kg (162 lb)   SpO2 96%  Body mass index is 29.63 kg/m².    General:  Normal appearing. No distress.  HEENT:  Normocephalic. Eyes conjunctiva clear lids without ptosis, pupils equal and reactive to light accommodation, ears normal shape and contour, canals are clear bilaterally, tympanic membranes are benign, nasal mucosa benign, oropharynx is without erythema, edema or exudates.  Neck:  Supple without JVD or bruit.  Pulmonary:  Clear to ausculation.  Normal effort. No rales, ronchi, or wheezing.  Cardiovascular:  Regular rate and rhythm without murmur. Carotid and radial pulses are intact and equal bilaterally.  Abdomen:  Soft, nontender, nondistended. Normal bowel sounds.   Neurologic:  Grossly nonfocal.  Lymph:  No cervical, supraclavicular lymph nodes are palpable.  Skin:  Warm and dry.  No obvious lesions.  Musculoskeletal:  Normal gait. No extremity cyanosis, clubbing, or edema.  Psych:  Normal mood and affect. Alert and oriented x3. Judgment and insight is normal.    Assessment/Plan:  1. Moderate episode of recurrent major depressive disorder (HCC)  2. FAUSTINO (generalized anxiety disorder)  Continue follow-up with therapy every 2 weeks.  " Increase fluoxetine to 40 mg/day.  Plan to return in about 6 weeks to reassess depression, anxiety, obsessiveness.  Notify me sooner if you have any questions or problems.  - FLUoxetine (PROZAC) 40 MG capsule; Take 1 Cap by mouth every day.  Dispense: 90 Cap; Refill: 0  - busPIRone (BUSPAR) 10 MG Tab tablet; Take 1 Tab by mouth 2 times a day.  Dispense: 180 Tab; Refill: 3    3. Chronic tension-type headache, not intractable  Continue follow-up with massage therapy and chiropractor monthly.  - tizanidine (ZANAFLEX) 4 MG Tab; TAKE 1 TABLET BY MOUTH EVERY 12 HOURS AS NEEDED FOR MUSCLE STIFFNESS  Dispense: 180 Tab; Refill: 3    4. Vitamin D insufficiency  Continue over-the-counter vitamin D supplement.    5. Overweight (BMI 25.0-29.9)  Recommend working on healthy diet, increasing exercise, and weight loss.    6. Need for vaccination  - Tdap =>6yo IM    Patient was seen for at least 25 minutes total face-to-face time with greater than 50% of that time spent on counseling and care coordination.    Educated in proper administration of medication(s) ordered today including safety, possible SE, risks, benefits, rationale and alternatives to therapy.   Supportive care, differential diagnoses, and indications for immediate follow-up discussed with patient.    Pathogenesis of diagnosis discussed including typical length and natural progression.    Instructed to return to clinic or nearest emergency department for any change in condition, further concerns, or worsening of symptoms.  Patient states understanding of the plan of care and discharge instructions.    Return in about 6 weeks (around 10/7/2019) for Depression, Anxiety, Follow up Medications.    I have placed the below orders and discussed them with an approved delegating provider. The MA is performing the below orders under the direction of Dr. Barrera.    Please note that this dictation was created using voice recognition software. I have made every reasonable attempt  to correct obvious errors, but I expect that there are errors of grammar and possibly content that I did not discover before finalizing the note.

## 2019-08-27 NOTE — ASSESSMENT & PLAN NOTE
This is a new problem to the examiner. Patient reports that she has experienced depression, anxiety, and PTSD for a few years.  The patient has been on fluoxetine for a couple of years at 20 mg/day.  The patient states that her dose was increased to 40 mg/day, however she was not on that dose very long before returning to 20 mg/day and adding BuSpar.  Patient states that she did not have any side effects from the increased dose.  Reports that the combination of the fluoxetine and the buspirone is helping to stabilize her mood, and decrease her anger, and less reactive, and more motivated to get out of bed.  The patient states that her anxiety has not improved.  Patient continues to go to therapy every 2 weeks with Dr. Leonora Teran.  Reports that she is working with a therapist to help decrease her anxiety by managing her compulsive behaviors.  Patient states that in every situation she is faced with she immediately goes to the worst case scenario in her mind.  Unless she is able to immediately address the issue she will focus on that problem the entire day.  Example, if she leaves the coffee machine plugged and it will bring down her house unless she goes home and unplugs it.  The patient's PHQ 9 score in March 2018 was 14, today it is 6.  The patient is not opposed to increasing fluoxetine dose again. The patient denies any suicidal ideations or thoughts of harming herself or others.  Depression Screen (PHQ-2/PHQ-9) 3/29/2018 3/29/2018 8/26/2019   PHQ-2 Total Score - 4 -   PHQ-2 Total Score - - 2   PHQ-2 Total Score 4 - -   PHQ-9 Total Score - 14 -   PHQ-9 Total Score - - 6   PHQ-9 Total Score 15 - -   Interpretation of PHQ-9 Total Score   Score Severity   5-9 Mild Depression

## 2019-08-27 NOTE — ASSESSMENT & PLAN NOTE
This is a new problem to the examiner.  Chronic, ongoing.  Continues to take OTC vitamin d supplement daily.    9/30/2016 09:05 2/15/2018 09:15   25-Hydroxy   Vitamin D 25 29 (L) 12 (L)

## 2019-08-27 NOTE — ASSESSMENT & PLAN NOTE
This is a new problem to the examiner. This is a problem for the patient that has been improving over the last year.  The patient states that her symptoms will range between headaches, severe headaches, or migraines.  The patient has been attending massage therapy and chiropractor 1 time per month as well as using tizanidine to help relax which has significantly decreased her symptoms.

## 2019-08-27 NOTE — ASSESSMENT & PLAN NOTE
"This is a new problem to the examiner.  This is an ongoing chronic problem for the patient that has significantly improved with the combination of fluoxetine 20 mg a day and buspirone 10 mg twice daily.  Patient does continue to feel like she \"gets stuck in a rabbit hole where one thing would make her feel like she has to constantly worry and think about and focus on that one thing\".  The patient is following with a psychiatrist for therapy and they are working on managing and controlling her obsessive-compulsive thoughts.  Patient uses as an example, if she is at work and her boss is in her office for a long time, the patient will immediately think that her boss is planning to fire her.  Or, if she leaves the house and forgets to unplug the coffee pot, she immediately thinks and focuses on the fact that the coffee pot is going to burn her house down and kill her dogs.  She is unable to let go of these obsessive thoughts until she physically addresses the aggravating issue.  Patient states that her therapist is working with her to learn how to cope with not immediate gratification of fixing the problems, the patient states that the uncomfortableness is still present in her mind.  Patient states that the fluoxetine and buspirone have helped to manage her anger and quick to react temperament, but she is still plagued with the obsessive thoughts.  Patient states that she is paranoid, and the way that she automatically thinks that the worst thing will happen in every situation she is faced with.  Patient states that she does not hear voices or see people.  "

## 2019-08-27 NOTE — ASSESSMENT & PLAN NOTE
"This is a new problem to the examiner.  Patient does not exercise or monitor diet closely.  Vitals 8/14/2018 1/16/2019 3/20/2019 8/26/2019   WEIGHT 161 167.77 161 162   HEIGHT 5' 2\" 5' 2\" 5' 2\" 5' 2\"   BMI 29.45 kg/m2 30.69 kg/m2 29.45 kg/m2 29.63 kg/m2     "

## 2019-10-24 ENCOUNTER — OFFICE VISIT (OUTPATIENT)
Dept: MEDICAL GROUP | Facility: PHYSICIAN GROUP | Age: 29
End: 2019-10-24
Payer: COMMERCIAL

## 2019-10-24 VITALS
DIASTOLIC BLOOD PRESSURE: 60 MMHG | OXYGEN SATURATION: 96 % | HEART RATE: 80 BPM | TEMPERATURE: 97.8 F | BODY MASS INDEX: 29.44 KG/M2 | SYSTOLIC BLOOD PRESSURE: 104 MMHG | WEIGHT: 160 LBS | HEIGHT: 62 IN

## 2019-10-24 DIAGNOSIS — E66.3 OVERWEIGHT (BMI 25.0-29.9): ICD-10-CM

## 2019-10-24 DIAGNOSIS — F41.1 GAD (GENERALIZED ANXIETY DISORDER): ICD-10-CM

## 2019-10-24 DIAGNOSIS — F33.1 MODERATE EPISODE OF RECURRENT MAJOR DEPRESSIVE DISORDER (HCC): ICD-10-CM

## 2019-10-24 PROCEDURE — 99213 OFFICE O/P EST LOW 20 MIN: CPT | Performed by: NURSE PRACTITIONER

## 2019-10-24 NOTE — ASSESSMENT & PLAN NOTE
"Chronic problem for the patient that is ongoing.  The patient has lost 2 pounds since her last visit in August 2019.  The patient continues to not work on healthy diet or exercise.  Vitals 1/16/2019 3/20/2019 8/26/2019 10/24/2019   WEIGHT 167.77 161 162 160   HEIGHT 5' 2\" 5' 2\" 5' 2\" 5' 2\"   BMI 30.69 kg/m2 29.45 kg/m2 29.63 kg/m2 29.26 kg/m2     "

## 2019-10-24 NOTE — ASSESSMENT & PLAN NOTE
"Chronic problem for the patient that is improving.  The patient was seen on 8/27/2019 for this issue and at that time her fluoxetine was increased from 20 mg a day to 40 mg/day.  The patient also continues to use buspirone 10 mg twice daily as well as follow with her therapist every 2 weeks.  Patient states that the increase in her medication is noticeable and she is \"better than I was\".  Patient thinks that her symptoms could be better, but she is not uncomfortable where she is at and she does not want to make any medication changes or dosage adjustments.  Patient reports that she is having less episodes where she \"gets stuck in a rabbit hole\", and is able to get herself out easier when she does.  She also reports that she is doing \"fairly good on a daily basis\".  "

## 2019-10-24 NOTE — PROGRESS NOTES
"CC:   Chief Complaint   Patient presents with   • Medication Management     Prozac, feeling better     HISTORY OF THE PRESENT ILLNESS: Patient is a 29 y.o. female. This pleasant patient is here today to follow up on depression, anxiety, and medications.    Health Maintenance: Completed    FAUSTINO (generalized anxiety disorder)  Major depressive disorder  Chronic problem for the patient that is improving.  The patient was seen on 8/27/2019 for this issue and at that time her fluoxetine was increased from 20 mg a day to 40 mg/day.  The patient also continues to use buspirone 10 mg twice daily as well as follow with her therapist every 2 weeks.  Patient states that the increase in her medication is noticeable and she is \"better than I was\".  Patient thinks that her symptoms could be better, but she is not uncomfortable where she is at and she does not want to make any medication changes or dosage adjustments.  Patient reports that she is having less episodes where she \"gets stuck in a rabbit hole\", and is able to get herself out easier when she does.  She also reports that she is doing \"fairly good on a daily basis\".    Overweight (BMI 25.0-29.9)  Chronic problem for the patient that is ongoing.  The patient has lost 2 pounds since her last visit in August 2019.  The patient continues to not work on healthy diet or exercise.  Vitals 1/16/2019 3/20/2019 8/26/2019 10/24/2019   WEIGHT 167.77 161 162 160   HEIGHT 5' 2\" 5' 2\" 5' 2\" 5' 2\"   BMI 30.69 kg/m2 29.45 kg/m2 29.63 kg/m2 29.26 kg/m2     Allergies: Nkda [no known drug allergy]  Current Outpatient Medications Ordered in Epic   Medication Sig Dispense Refill   • tizanidine (ZANAFLEX) 4 MG Tab TAKE 1 TABLET BY MOUTH EVERY 12 HOURS AS NEEDED FOR MUSCLE STIFFNESS 180 Tab 3   • busPIRone (BUSPAR) 10 MG Tab tablet Take 1 Tab by mouth 2 times a day. 180 Tab 3   • FLUoxetine (PROZAC) 40 MG capsule Take 1 Cap by mouth every day. 90 Cap 0   • Cyanocobalamin (VITAMIN B-12) 3000 MCG " SL Tab Place  under tongue.     • Cholecalciferol (VITAMIN D-3) 5000 units Tab Take  by mouth.       No current Epic-ordered facility-administered medications on file.      Past Medical History:   Diagnosis Date   • Anxiety    • Bleeding nose chronic   • Depression    • Migraine [id number]   • Mild dysplasia of cervix (KAREL I) 3/16/2010   • Palpitations 2012   • PTSD (post-traumatic stress disorder)    • Syncope 2012     Past Surgical History:   Procedure Laterality Date   • DENTAL EXTRACTION(S)  2006    one wisdom tooth     Social History     Tobacco Use   • Smoking status: Former Smoker     Packs/day: 1.00     Years: 3.00     Pack years: 3.00     Types: Cigarettes     Last attempt to quit: 2009     Years since quittin.9   • Smokeless tobacco: Never Used   Substance Use Topics   • Alcohol use: Yes     Alcohol/week: 1.2 oz     Types: 2 Shots of liquor per week     Comment: occ   • Drug use: No     Social History     Social History Narrative   • Not on file     Family History   Problem Relation Age of Onset   • Cancer Mother         Cervical   • Depression Mother    • Anxiety disorder Mother    • Alcohol/Drug Mother    • Hypertension Father    • Anxiety disorder Father    • Heart Disease Father    • Alcohol abuse Father    • Hyperlipidemia Father    • No Known Problems Sister    • Alcohol/Drug Maternal Uncle    • Anxiety disorder Paternal Aunt    • Depression Paternal Aunt    • Colon Cancer Paternal Aunt 58   • Alcohol abuse Paternal Uncle    • Depression Maternal Grandmother    • Anxiety disorder Maternal Grandmother    • Heart Attack Maternal Grandfather    • Alcohol abuse Maternal Grandfather    • Depression Paternal Grandmother    • Anxiety disorder Paternal Grandmother    • Alcohol abuse Paternal Grandmother    • Diabetes Paternal Grandfather    • Hypertension Paternal Grandfather    • Alcohol abuse Paternal Grandfather    • Anxiety disorder Paternal Aunt    • Depression Paternal Aunt    • Alcohol  "abuse Paternal Aunt    • Anxiety disorder Paternal Aunt    • Depression Paternal Aunt    • Alcohol/Drug Maternal Uncle    • Stroke Neg Hx      ROS:   Constitutional:  Negative for fever, chills, unexpected weight change, night sweats, body aches, sleep issues, and fatigue/generalized weakness.   HEENT: Positive for headaches/migraines.  Negative for vision changes, hearing changes, ear pain, tinnitus, ear discharge, rhinorrhea, sinus congestion, sneezing, sore throat, and neck pain.    Respiratory:  Negative for cough, shortness of breath, sputum production, hemoptysis, chest congestion, dyspnea, wheezing, and crackles.    Cardiovascular:  Negative for chest pain, palpitations, ROD, paroxsymal nocturnal dyspnea, orthopnea, and bilateral lower extremity edema.   Gastrointestinal:  Negative for heartburn, nausea, vomiting, abdominal pain, hematochezia, melena, diarrhea, constipation, and greasy/foul-smelling stools.   Musculoskeletal:  Negative for myalgias, back pain, and joint pain.   Neurological: Positive for headaches/migraines. Negative for dizziness, tingling, tremors, focal sensory deficit, focal weakness.   Psychiatric/Behavioral: Positive for depression, anxiety, PTSD, obsessiveness, occasional panic attacks.  Negative for suicidal/homicidal ideation and memory loss.        Exam: /60 (BP Location: Left arm, Patient Position: Sitting, BP Cuff Size: Adult)   Pulse 80   Temp 36.6 °C (97.8 °F) (Temporal)   Ht 1.575 m (5' 2\")   Wt 72.6 kg (160 lb)   SpO2 96%  Body mass index is 29.26 kg/m².    General: Well nourished, well developed female in NAD  HEENT: Atraumatic, normocephalic, extraocular movements intact, pupils equal and reactive to light  Neck: Supple, FROM  Chest: No deformities, Equal chest expansion  Respiratory: Unlabored, no stridor or audible wheeze  Abdomen: Non-Distended  Extremities: No Clubbing, Cyanosis, or Edema  Skin: Warm/dry, without rashes  Neuro: A/O x 4, CN 2-12 Grossly intact, " Motor/sensory grossly intact  Psych: Normal behavior, normal affect    Assessment/Plan:  1. Moderate episode of recurrent major depressive disorder (HCC)  2. FAUSTINO (generalized anxiety disorder)  Continue follow-up with therapy every 2 weeks.  Continue fluoxetine 40 mg/day and buspirone 10 mg twice daily, refill not needed today.  Patient is happy with current dosing.  Plan to follow-up in 6 months or sooner if needed.    3. Overweight (BMI 25.0-29.9)  Recommend healthy diet, exercise, weight loss.  Patient's body mass index is 29.26 kg/m². Exercise and nutrition counseling were performed at this visit.    Educated in proper administration of medication(s) ordered today including safety, possible SE, risks, benefits, rationale and alternatives to therapy.   Supportive care, differential diagnoses, and indications for immediate follow-up discussed with patient.    Pathogenesis of diagnosis discussed including typical length and natural progression.    Instructed to return to clinic or nearest emergency department for any change in condition, further concerns, or worsening of symptoms.  Patient states understanding of the plan of care and discharge instructions.    Return in about 6 months (around 4/24/2020) for Anxiety, Follow up Medications.    Please note that this dictation was created using voice recognition software. I have made every reasonable attempt to correct obvious errors, but I expect that there are errors of grammar and possibly content that I did not discover before finalizing the note.

## 2019-12-18 DIAGNOSIS — F33.1 MODERATE EPISODE OF RECURRENT MAJOR DEPRESSIVE DISORDER (HCC): ICD-10-CM

## 2019-12-18 DIAGNOSIS — F41.1 GAD (GENERALIZED ANXIETY DISORDER): ICD-10-CM

## 2019-12-18 RX ORDER — FLUOXETINE HYDROCHLORIDE 40 MG/1
CAPSULE ORAL
Qty: 90 CAP | Refills: 3 | Status: SHIPPED | OUTPATIENT
Start: 2019-12-18 | End: 2020-12-21 | Stop reason: SDUPTHER

## 2019-12-19 NOTE — TELEPHONE ENCOUNTER
Requested Prescriptions     Pending Prescriptions Disp Refills   • fluoxetine (PROZAC) 40 MG capsule [Pharmacy Med Name: FLUOXETINE 40MG CAPSULES] 90 Cap 3     Sig: TAKE 1 CAPSULE BY MOUTH EVERY DAY       RICARDO Solis.

## 2019-12-19 NOTE — TELEPHONE ENCOUNTER
Was the patient seen in the last year in this department? Yes 10/24/19    Does patient have an active prescription for medications requested? No     Received Request Via: Pharmacy

## 2020-02-28 ENCOUNTER — OFFICE VISIT (OUTPATIENT)
Dept: URGENT CARE | Facility: PHYSICIAN GROUP | Age: 30
End: 2020-02-28
Payer: COMMERCIAL

## 2020-02-28 VITALS
OXYGEN SATURATION: 100 % | HEIGHT: 62 IN | RESPIRATION RATE: 15 BRPM | WEIGHT: 160 LBS | DIASTOLIC BLOOD PRESSURE: 84 MMHG | HEART RATE: 72 BPM | TEMPERATURE: 98.2 F | SYSTOLIC BLOOD PRESSURE: 138 MMHG | BODY MASS INDEX: 29.44 KG/M2

## 2020-02-28 DIAGNOSIS — R21 RASH AND NONSPECIFIC SKIN ERUPTION: ICD-10-CM

## 2020-02-28 PROCEDURE — 99214 OFFICE O/P EST MOD 30 MIN: CPT | Performed by: PHYSICIAN ASSISTANT

## 2020-02-28 ASSESSMENT — PAIN SCALES - GENERAL: PAINLEVEL: NO PAIN

## 2020-02-28 ASSESSMENT — FIBROSIS 4 INDEX: FIB4 SCORE: 0.54

## 2020-02-29 ASSESSMENT — ENCOUNTER SYMPTOMS
WHEEZING: 0
HEMOPTYSIS: 0
COUGH: 0
SHORTNESS OF BREATH: 0
SORE THROAT: 0
FEVER: 0
PALPITATIONS: 0
CHILLS: 0
SPUTUM PRODUCTION: 0

## 2020-02-29 NOTE — PATIENT INSTRUCTIONS
Vasculitis  Introduction  Vasculitis is swelling (inflammation) of the blood vessels. With vasculitis, the blood vessels can become thick, narrow, scarred, or weak, and enough blood may not be able to flow through them. This can cause damage to the muscles, kidneys, lungs, brain, and other parts of the body.  There are many types of vasculitis. Some last only a short time while others last a long time.  What are the causes?  The exact cause is unknown, but vasculitis can develop when the body's immune system attacks its own blood vessels. This attack can be caused by:  · An infection.  · An immune system disease, such as lupus, rheumatoid arthritis, or scleroderma.  · An allergic reaction to a medicine.  · Cancer that affects blood cells, such as leukemia and lymphoma.  What increases the risk?  · Being a smoker.  · Being under stress.  · Having a physical injury.  What are the signs or symptoms?  Symptoms vary depending on the type of vasculitis you have. Symptoms that are common to all types of vasculitis include:  · Fever.  · Poor appetite.  · Weight loss.  · Feeling very tired.  · Having aches and pains.  · Weakness.  · Numbness in an area of your body.  Symptoms for specific types of vasculitis include:  · Skin problems, such as sores, spots, or rashes.  · Trouble seeing.  · Trouble breathing.  · Blood in your urine.  · Headaches.  · Stomach pain.  · Stuffy or bloody nose.  How is this diagnosed?  Your health care provider will ask about your symptoms and do a physical exam. You may have tests done, such as:  · A complete blood count (CBC).  · Erythrocyte sedimentation, also called sed rate test.  · C-reactive protein (CRP).  · Antineutrophil cytoplasmic antibodies (ANCA).  · A urine test.  · A biopsy of a blood vessel.  · A nerve conduction study.  · Imaging tests, such as:  ¨ X-rays.  ¨ A CT scan.  ¨ An ultrasound.  ¨ An MRI.  ¨ Angiography.  How is this treated?  Treatment will depend on the type of  vasculitis you have and how severe it is. Sometimes treatment is not needed. Treatment often includes:  · Medicines.  · Physical therapy or occupational therapy. This helps strengthen muscles that were weakened by the disease.  You will need to see your health care provider while you are being treated. During follow-up visits, your health care provider will:  · Perform blood tests and bone density tests.  · Check your blood pressure and blood sugar.  · Check for side effects of any medicines you are taking.  Vasculitis cannot always be cured. Sometimes symptoms go away but the disease does not (the disease goes in remission). If symptoms return, increased treatment may be needed.  Follow these instructions at home:  · Take medicines only as directed by your health care provider.  · Keep all follow-up visits as directed by your health care provider. This is important.  · Exercise. Talk with your health care provider about what exercises are okay for you to do. Usually exercises that increase your heart rate (aerobic exercise), such as walking, are recommended. Aerobic exercise helps control your blood pressure and prevent bone loss.  · Follow a healthy diet. Include healthy sources of protein, fruits, vegetables, and whole grains in your diet.  · Learn as much as you can about vasculitis, and consider joining a support group. Understanding your condition and talking with others who have it may help you cope. Talk with your health care provider if you feel stressed, anxious, or depressed.  Contact a health care provider if:  · Your symptoms return, or you have new symptoms.  · Your fever, fatigue, headache, or weight loss gets worse.  · You have signs of infection, such as fever, warmth, tenderness, redness, or swelling.  Get help right away if:  · Your vision gets worse.  · Your pain does not go away, even after you take pain medicine.  · You have chest or stomach pain.  · You have trouble breathing.  · One side of  your face or body suddenly becomes weak or numb.  · Your nose bleeds.  · There is blood in your urine.  This information is not intended to replace advice given to you by your health care provider. Make sure you discuss any questions you have with your health care provider.  Document Released: 10/14/2010 Document Revised: 05/25/2017 Document Reviewed: 02/11/2015  © 2017 [employer]

## 2020-02-29 NOTE — PROGRESS NOTES
Subjective:      Deysi Goodrich is a 29 y.o. female who presents with Hand Pain (left hand redness and swelling noticed this morning)            Rash   This is a new problem. The current episode started today. The problem is unchanged. Location: left hand. The rash is characterized by bruising. She was exposed to nothing. Pertinent negatives include no congestion, cough, fever, shortness of breath or sore throat.       Review of Systems   Constitutional: Negative for chills, fever and malaise/fatigue.   HENT: Negative for congestion, ear pain and sore throat.    Respiratory: Negative for cough, hemoptysis, sputum production, shortness of breath and wheezing.    Cardiovascular: Negative for chest pain and palpitations.   Skin: Positive for rash.   All other systems reviewed and are negative.    PMH:  has a past medical history of Anxiety, Bleeding nose (chronic), Depression, Migraine ([id number]), Mild dysplasia of cervix (KAREL I) (3/16/2010), Palpitations (6/5/2012), PTSD (post-traumatic stress disorder), and Syncope (6/5/2012).  MEDS:   Current Outpatient Medications:   •  fluoxetine (PROZAC) 40 MG capsule, TAKE 1 CAPSULE BY MOUTH EVERY DAY, Disp: 90 Cap, Rfl: 3  •  tizanidine (ZANAFLEX) 4 MG Tab, TAKE 1 TABLET BY MOUTH EVERY 12 HOURS AS NEEDED FOR MUSCLE STIFFNESS, Disp: 180 Tab, Rfl: 3  •  busPIRone (BUSPAR) 10 MG Tab tablet, Take 1 Tab by mouth 2 times a day., Disp: 180 Tab, Rfl: 3  •  Cyanocobalamin (VITAMIN B-12) 3000 MCG SL Tab, Place  under tongue., Disp: , Rfl:   •  Cholecalciferol (VITAMIN D-3) 5000 units Tab, Take  by mouth., Disp: , Rfl:   ALLERGIES:   Allergies   Allergen Reactions   • Nkda [No Known Drug Allergy]      SURGHX:   Past Surgical History:   Procedure Laterality Date   • DENTAL EXTRACTION(S)  2006    one wisdom tooth     SOCHX:  reports that she quit smoking about 10 years ago. Her smoking use included cigarettes. She has a 3.00 pack-year smoking history. She has never used smokeless tobacco.  "She reports current alcohol use of about 1.2 oz of alcohol per week. She reports that she does not use drugs.  FH: Family history was reviewed, no pertinent findings to report       Objective:     Blood Pressure 138/84   Pulse 72   Temperature 36.8 °C (98.2 °F)   Respiration 15   Height 1.575 m (5' 2\")   Weight 72.6 kg (160 lb)   Oxygen Saturation 100%   Body Mass Index 29.26 kg/m²      Physical Exam  Vitals signs reviewed.   Constitutional:       Appearance: She is well-developed.   Cardiovascular:      Rate and Rhythm: Normal rate and regular rhythm.      Heart sounds: Normal heart sounds.   Pulmonary:      Effort: Pulmonary effort is normal.      Breath sounds: Normal breath sounds.   Musculoskeletal: Normal range of motion.   Skin:     General: Skin is warm and dry.      Findings: Rash present.      Comments: Rash distributions:  Located:left knuckle  Size: 3x2 cm  Pattern: vasculitic/purpuric   Neurological:      Mental Status: She is alert and oriented to person, place, and time.   Psychiatric:         Behavior: Behavior normal.         Thought Content: Thought content normal.         Judgment: Judgment normal.                 Assessment/Plan:   Pt is a 29-year-old female who presents for evaluation of a rash.  Pt states she woke up with purple rash on her left hand.  Pt denies pain/pruritis with the rash.  No prior episodes.  Pt denies fever, sore throat, or arthralgias.   No new medications in last 2-3 months.  No recent travel, time outdoors, or occupational exposure.  Pt is up to date with vaccinations.  Vital signs normal.  On exam the rash appears to be distributed in a vasculopathic pattern and is non-blanchable on the left hand.  It does not cover the extensor/flexor surfaces, palms/soles, or mucosal surfaces.    Diagnosis differential includes, but not limited to:      Vasculopathic Disorders:      Petechiae: ITP, TTP, DIC, BM failure, ASA/NSAIDs, trauma, RMSF    Palpable purpura: Idiopathic, " drug, viral, HSP, rheum etiology    Ecchymoses: Medication, liver failure,Vit K deficiency, DIC    Retiform Purpura: Infection, prothrombotic, coumadin, levamisole        1. Rash and nonspecific skin eruption  - observation  - consider labs if no resolution    Differential diagnosis, natural history, supportive care discussed. Follow-up with primary care provider within 7-10 days, emergency room precautions discussed.  Patient and/or family appears understanding of information.  Handout and review of patients diagnosis and treatment was discussed extensively.

## 2020-04-29 ENCOUNTER — OFFICE VISIT (OUTPATIENT)
Dept: MEDICAL GROUP | Facility: PHYSICIAN GROUP | Age: 30
End: 2020-04-29
Payer: COMMERCIAL

## 2020-04-29 VITALS
HEIGHT: 62 IN | OXYGEN SATURATION: 97 % | TEMPERATURE: 97.9 F | SYSTOLIC BLOOD PRESSURE: 100 MMHG | DIASTOLIC BLOOD PRESSURE: 60 MMHG | BODY MASS INDEX: 29.63 KG/M2 | HEART RATE: 99 BPM | RESPIRATION RATE: 16 BRPM | WEIGHT: 161 LBS

## 2020-04-29 DIAGNOSIS — E66.3 OVERWEIGHT: ICD-10-CM

## 2020-04-29 DIAGNOSIS — F33.1 MODERATE EPISODE OF RECURRENT MAJOR DEPRESSIVE DISORDER (HCC): ICD-10-CM

## 2020-04-29 DIAGNOSIS — F41.1 GENERALIZED ANXIETY DISORDER: ICD-10-CM

## 2020-04-29 DIAGNOSIS — E55.9 VITAMIN D INSUFFICIENCY: ICD-10-CM

## 2020-04-29 DIAGNOSIS — Z00.00 ENCOUNTER FOR WELL ADULT EXAM WITHOUT ABNORMAL FINDINGS: ICD-10-CM

## 2020-04-29 DIAGNOSIS — Z11.3 SCREENING FOR STDS (SEXUALLY TRANSMITTED DISEASES): ICD-10-CM

## 2020-04-29 PROCEDURE — 99395 PREV VISIT EST AGE 18-39: CPT | Performed by: NURSE PRACTITIONER

## 2020-04-29 ASSESSMENT — FIBROSIS 4 INDEX: FIB4 SCORE: 0.54

## 2020-04-29 NOTE — ASSESSMENT & PLAN NOTE
Chronic, ongoing and stable.  Continues Prozac 40 mg/day and BuSpar 10 mg twice daily.  Continues to follow with her therapist every 2 weeks.  Patient reports that she feels more stable and her moods and emotions are more consistent.  Reports that over the last few months, with COVID-19, it has been a little more difficult, but she has been able to manage with medications and coping mechanisms. The patient denies any suicidal ideations or thoughts of harming herself or others.

## 2020-04-29 NOTE — ASSESSMENT & PLAN NOTE
"Chronic, ongoing.   Weight change: 1 pound weight gain  Diet: Poor  Exercise: Minimal  Vitals 8/26/2019 10/24/2019 2/28/2020 4/29/2020   WEIGHT 162 160 160 161   HEIGHT 5' 2\" 5' 2\" 5' 2\" 5' 2\"   BMI 29.63 kg/m2 29.26 kg/m2 29.26 kg/m2 29.45 kg/m2     "

## 2020-04-29 NOTE — ASSESSMENT & PLAN NOTE
Chronic, ongoing.  Denies any muscle weakness.  No history of CKD.  No history of IBD's, celiac, CF, or surgeries causing malabsorption.  Patient is overweight.  Is taking vitamin d 5000 units/day.   Due for updated lab work.   9/30/2016 09:05 2/15/2018 09:15   25-Hydroxy   Vitamin D 25 29 (L) 12 (L)

## 2020-04-29 NOTE — PROGRESS NOTES
"Subjective:     CC:   Chief Complaint   Patient presents with   • Annual Exam     HPI:   Deysi Goodrich is a 29 y.o. female who presents for annual exam    Major depressive disorder  FAUSTINO (generalized anxiety disorder)  Chronic, ongoing and stable.  Continues Prozac 40 mg/day and BuSpar 10 mg twice daily.  Continues to follow with her therapist every 2 weeks.  Patient reports that she feels more stable and her moods and emotions are more consistent.  Reports that over the last few months, with COVID-19, it has been a little more difficult, but she has been able to manage with medications and coping mechanisms. The patient denies any suicidal ideations or thoughts of harming herself or others.    Overweight  Chronic, ongoing.   Weight change: 1 pound weight gain  Diet: Poor  Exercise: Minimal  Vitals 8/26/2019 10/24/2019 2/28/2020 4/29/2020   WEIGHT 162 160 160 161   HEIGHT 5' 2\" 5' 2\" 5' 2\" 5' 2\"   BMI 29.63 kg/m2 29.26 kg/m2 29.26 kg/m2 29.45 kg/m2     Vitamin D insufficiency  Chronic, ongoing.  Denies any muscle weakness.  No history of CKD.  No history of IBD's, celiac, CF, or surgeries causing malabsorption.  Patient is overweight.  Is taking vitamin d 5000 units/day.   Due for updated lab work.   9/30/2016 09:05 2/15/2018 09:15   25-Hydroxy   Vitamin D 25 29 (L) 12 (L)     Patient has GYN provider: Yes  Last Pap Smear: 4/18/2019-due 4/2022  H/O Abnormal Pap: No  Last Mammogram: N/A  Last Bone Density Test: N/A  Last Colorectal Cancer Screening: N/A  Last Tdap: 8/26/2018  Received HPV series: Aged out    Exercise: no regular exercise, sedentary  Diet: Poor    Patient's last menstrual period was 04/15/2020 (approximate).  Hx STDs: Patient cannot recall  Birth control: Condoms  Menses every month with 4 days with variable bleeding.  Reports mild cramping and does not take OTC analgesics for cramps.  No significant bloating/fluid retention, pelvic pain, or dyspareunia. No abnormal vaginal discharge.  No breast " tenderness, mass, nipple discharge, changes in size or contour, or abnormal cyclic discomfort.    OB History    Para Term  AB Living   1 0 0 0 1 0   SAB TAB Ectopic Molar Multiple Live Births   1 0 0 0 0 0   Obstetric Comments   Miscarriage       She  reports being sexually active and has had partner(s) who are Male. She reports using the following method of birth control/protection: Condom.    She  has a past medical history of Anxiety, Bleeding nose (chronic), Depression, Migraine ([id number]), Mild dysplasia of cervix (KAREL I) (3/16/2010), Palpitations (2012), PTSD (post-traumatic stress disorder), and Syncope (2012).  She  has a past surgical history that includes dental extraction(s) ().    Family History   Problem Relation Age of Onset   • Cancer Mother         Cervical   • Depression Mother    • Anxiety disorder Mother    • Alcohol/Drug Mother    • Hypertension Father    • Anxiety disorder Father    • Heart Disease Father    • Alcohol abuse Father    • Hyperlipidemia Father    • No Known Problems Sister    • Alcohol/Drug Maternal Uncle    • Anxiety disorder Paternal Aunt    • Depression Paternal Aunt    • Colon Cancer Paternal Aunt 58   • Alcohol abuse Paternal Uncle    • Depression Maternal Grandmother    • Anxiety disorder Maternal Grandmother    • Heart Attack Maternal Grandfather    • Alcohol abuse Maternal Grandfather    • Depression Paternal Grandmother    • Anxiety disorder Paternal Grandmother    • Alcohol abuse Paternal Grandmother    • Diabetes Paternal Grandfather    • Hypertension Paternal Grandfather    • Alcohol abuse Paternal Grandfather    • Anxiety disorder Paternal Aunt    • Depression Paternal Aunt    • Alcohol abuse Paternal Aunt    • Anxiety disorder Paternal Aunt    • Depression Paternal Aunt    • Alcohol/Drug Maternal Uncle    • Stroke Neg Hx      Social History     Tobacco Use   • Smoking status: Former Smoker     Packs/day: 1.00     Years: 3.00     Pack  years: 3.00     Types: Cigarettes     Last attempt to quit: 11/1/2009     Years since quitting: 10.4   • Smokeless tobacco: Never Used   Substance Use Topics   • Alcohol use: Yes     Alcohol/week: 1.2 oz     Types: 2 Shots of liquor per week     Comment: occ   • Drug use: No     Patient Active Problem List    Diagnosis Date Noted   • FAUSTINO (generalized anxiety disorder) 06/08/2018   • Headache 03/29/2018   • PTSD (post-traumatic stress disorder) 02/03/2018   • Overweight 02/01/2018   • Vitamin D insufficiency 02/01/2018   • Major depressive disorder 02/01/2018   • Panic attacks 07/01/2013   • Mild dysplasia of cervix (KAREL I) 03/16/2010     Current Outpatient Medications   Medication Sig Dispense Refill   • fluoxetine (PROZAC) 40 MG capsule TAKE 1 CAPSULE BY MOUTH EVERY DAY 90 Cap 3   • tizanidine (ZANAFLEX) 4 MG Tab TAKE 1 TABLET BY MOUTH EVERY 12 HOURS AS NEEDED FOR MUSCLE STIFFNESS 180 Tab 3   • busPIRone (BUSPAR) 10 MG Tab tablet Take 1 Tab by mouth 2 times a day. 180 Tab 3   • Cyanocobalamin (VITAMIN B-12) 3000 MCG SL Tab Place  under tongue.     • Cholecalciferol (VITAMIN D-3) 5000 units Tab Take  by mouth.       No current facility-administered medications for this visit.      Allergies   Allergen Reactions   • Nkda [No Known Drug Allergy]      Review of Systems   Constitutional: Negative for fever, chills and malaise/fatigue.   HENT: Negative for congestion.    Eyes: Negative for pain.   Respiratory: Negative for cough and shortness of breath.    Cardiovascular: Negative for chest pain and leg swelling.   Gastrointestinal: Negative for nausea, vomiting, abdominal pain and diarrhea.   Genitourinary: Negative for dysuria and hematuria.   Skin: Negative for rash.   Neurological: Negative for dizziness, focal weakness and headaches.   Endo/Heme/Allergies: Does not bruise/bleed easily.   Psychiatric/Behavioral: + depression and anxiety. The patient is not nervous/anxious currently.      Objective:   /60 (BP  "Location: Right arm, Patient Position: Sitting, BP Cuff Size: Adult)   Pulse 99   Temp 36.6 °C (97.9 °F) (Temporal)   Resp 16   Ht 1.575 m (5' 2\")   Wt 73 kg (161 lb)   LMP 04/15/2020 (Approximate)   SpO2 97%   BMI 29.45 kg/m²     Wt Readings from Last 4 Encounters:   04/29/20 73 kg (161 lb)   02/28/20 72.6 kg (160 lb)   10/24/19 72.6 kg (160 lb)   08/26/19 73.5 kg (162 lb)     A chaperone was offered to the patient during today's exam. Patient declined chaperone.    General:  Normal appearing. No distress.  HEENT:  Normocephalic. Eyes conjunctiva clear lids without ptosis, pupils equal and reactive to light accommodation, ears normal shape and contour, canals are clear bilaterally, tympanic membranes are benign, nasal mucosa benign, oropharynx is without erythema, edema or exudates. Sinuses (frontal and maxillary) nontender to palpation.  Neck:  Supple without JVD or bruit. Thyroid is not enlarged.  Pulmonary:  Clear to ausculation.  Normal effort. No rales, ronchi, or wheezing.  Cardiovascular:  Regular rate and rhythm without murmur. Carotid and radial pulses are intact and equal bilaterally.  Abdomen:  Soft, nontender, nondistended. Normal bowel sounds.   Neurologic:  Grossly nonfocal.  Lymph:  No cervical, supraclavicular lymph nodes are palpable.  Skin:  Warm and dry.  No obvious lesions.  Musculoskeletal:  Normal gait. No extremity cyanosis, clubbing, or edema.  Psych:  Normal mood and affect. Alert and oriented x3. Judgment and insight is normal.    Assessment and Plan:     1. Encounter for well adult exam without abnormal findings    2. Moderate episode of recurrent major depressive disorder (HCC)  3. FAUSTINO (generalized anxiety disorder)  Chronic, stable.  Continue follow up with therapy.  Continue Prozac 40 mg/day, does not need a refill.  Continue Buspar 10 mg/BID, may try TID if needed, notify me if increase is effective.  Due for updated labs.  - TSH WITH REFLEX TO FT4; Future    4. " Overweight  Chronic, ongoing.  Encouraged diet high in fruits, vegetables, and fiber. And a diet low in salt, refined carbohydrates, cholesterol, saturated fat, and trans fatty acids.    Encouraged a minimum of 30 minutes of moderate intensity aerobic exercise (eg, brisk walking) is recommended on five days each week. Or 30 minutes of vigorous-intensity aerobic exercise (eg, jogging) on three days each week.   Patient's body mass index is 29.45 kg/m². Exercise and nutrition counseling were performed at this visit.  Due for updated labs.  - CBC WITH DIFFERENTIAL; Future  - Comp Metabolic Panel; Future  - Lipid Profile; Future  - TSH WITH REFLEX TO FT4; Future    5. Vitamin D insufficiency  Chronic, ongoing.  Continue OTC vitamin d supplement daily.  Due for updated lab work.  - VITAMIN D,25 HYDROXY; Future    6. Screening for STDs (sexually transmitted diseases)  Patient is requesting routine screening for STD's. Denies any symptoms.  - Chlamydia/GC PCR Urine Or Swab; Future  - HIV AG/AB COMBO ASSAY SCREENING; Future  - HSV 1/2 IGG W/ TYPE SPECIFIC RFLX; Future  - RPR (SYPHILIS); Future    Health maintenance:  Up to date   Labs per orders  Immunizations: Up to date  Patient counseled about skin care, diet, supplements, and exercise.  Discussed breast self exam, STD prevention, HIV risk factors and prevention, feminine hygiene, adequate intake of calcium and vitamin D, diet and exercise.     Follow-up: Return in about 1 year (around 4/29/2021) for Preventative Annual, As needed.

## 2020-06-26 ENCOUNTER — HOSPITAL ENCOUNTER (OUTPATIENT)
Dept: LAB | Facility: MEDICAL CENTER | Age: 30
End: 2020-06-26
Attending: NURSE PRACTITIONER
Payer: COMMERCIAL

## 2020-06-26 DIAGNOSIS — F41.1 GENERALIZED ANXIETY DISORDER: ICD-10-CM

## 2020-06-26 DIAGNOSIS — F33.1 MODERATE EPISODE OF RECURRENT MAJOR DEPRESSIVE DISORDER (HCC): ICD-10-CM

## 2020-06-26 DIAGNOSIS — E66.3 OVERWEIGHT: ICD-10-CM

## 2020-06-26 DIAGNOSIS — Z11.3 SCREENING FOR STDS (SEXUALLY TRANSMITTED DISEASES): ICD-10-CM

## 2020-06-26 DIAGNOSIS — E55.9 VITAMIN D INSUFFICIENCY: ICD-10-CM

## 2020-06-26 LAB
25(OH)D3 SERPL-MCNC: 41 NG/ML (ref 30–100)
ALBUMIN SERPL BCP-MCNC: 4.7 G/DL (ref 3.2–4.9)
ALBUMIN/GLOB SERPL: 1.8 G/DL
ALP SERPL-CCNC: 69 U/L (ref 30–99)
ALT SERPL-CCNC: 43 U/L (ref 2–50)
ANION GAP SERPL CALC-SCNC: 14 MMOL/L (ref 7–16)
AST SERPL-CCNC: 23 U/L (ref 12–45)
BASOPHILS # BLD AUTO: 0.6 % (ref 0–1.8)
BASOPHILS # BLD: 0.03 K/UL (ref 0–0.12)
BILIRUB SERPL-MCNC: 0.6 MG/DL (ref 0.1–1.5)
BUN SERPL-MCNC: 14 MG/DL (ref 8–22)
CALCIUM SERPL-MCNC: 9.3 MG/DL (ref 8.5–10.5)
CHLORIDE SERPL-SCNC: 101 MMOL/L (ref 96–112)
CHOLEST SERPL-MCNC: 177 MG/DL (ref 100–199)
CO2 SERPL-SCNC: 22 MMOL/L (ref 20–33)
CREAT SERPL-MCNC: 0.66 MG/DL (ref 0.5–1.4)
EOSINOPHIL # BLD AUTO: 0.07 K/UL (ref 0–0.51)
EOSINOPHIL NFR BLD: 1.4 % (ref 0–6.9)
ERYTHROCYTE [DISTWIDTH] IN BLOOD BY AUTOMATED COUNT: 41.8 FL (ref 35.9–50)
FASTING STATUS PATIENT QL REPORTED: NORMAL
GLOBULIN SER CALC-MCNC: 2.6 G/DL (ref 1.9–3.5)
GLUCOSE SERPL-MCNC: 95 MG/DL (ref 65–99)
HCT VFR BLD AUTO: 42.3 % (ref 37–47)
HDLC SERPL-MCNC: 45 MG/DL
HGB BLD-MCNC: 13.8 G/DL (ref 12–16)
HIV 1+2 AB+HIV1 P24 AG SERPL QL IA: NORMAL
IMM GRANULOCYTES # BLD AUTO: 0.01 K/UL (ref 0–0.11)
IMM GRANULOCYTES NFR BLD AUTO: 0.2 % (ref 0–0.9)
LDLC SERPL CALC-MCNC: 105 MG/DL
LYMPHOCYTES # BLD AUTO: 1.42 K/UL (ref 1–4.8)
LYMPHOCYTES NFR BLD: 27.9 % (ref 22–41)
MCH RBC QN AUTO: 30.1 PG (ref 27–33)
MCHC RBC AUTO-ENTMCNC: 32.6 G/DL (ref 33.6–35)
MCV RBC AUTO: 92.2 FL (ref 81.4–97.8)
MONOCYTES # BLD AUTO: 0.33 K/UL (ref 0–0.85)
MONOCYTES NFR BLD AUTO: 6.5 % (ref 0–13.4)
NEUTROPHILS # BLD AUTO: 3.23 K/UL (ref 2–7.15)
NEUTROPHILS NFR BLD: 63.4 % (ref 44–72)
NRBC # BLD AUTO: 0 K/UL
NRBC BLD-RTO: 0 /100 WBC
PLATELET # BLD AUTO: 276 K/UL (ref 164–446)
PMV BLD AUTO: 10.5 FL (ref 9–12.9)
POTASSIUM SERPL-SCNC: 4.3 MMOL/L (ref 3.6–5.5)
PROT SERPL-MCNC: 7.3 G/DL (ref 6–8.2)
RBC # BLD AUTO: 4.59 M/UL (ref 4.2–5.4)
SODIUM SERPL-SCNC: 137 MMOL/L (ref 135–145)
TREPONEMA PALLIDUM IGG+IGM AB [PRESENCE] IN SERUM OR PLASMA BY IMMUNOASSAY: NORMAL
TRIGL SERPL-MCNC: 134 MG/DL (ref 0–149)
TSH SERPL DL<=0.005 MIU/L-ACNC: 1.84 UIU/ML (ref 0.38–5.33)
WBC # BLD AUTO: 5.1 K/UL (ref 4.8–10.8)

## 2020-06-26 PROCEDURE — 86695 HERPES SIMPLEX TYPE 1 TEST: CPT

## 2020-06-26 PROCEDURE — 80053 COMPREHEN METABOLIC PANEL: CPT

## 2020-06-26 PROCEDURE — 84443 ASSAY THYROID STIM HORMONE: CPT

## 2020-06-26 PROCEDURE — 86780 TREPONEMA PALLIDUM: CPT

## 2020-06-26 PROCEDURE — 87591 N.GONORRHOEAE DNA AMP PROB: CPT

## 2020-06-26 PROCEDURE — 86696 HERPES SIMPLEX TYPE 2 TEST: CPT

## 2020-06-26 PROCEDURE — 80061 LIPID PANEL: CPT

## 2020-06-26 PROCEDURE — 86694 HERPES SIMPLEX NES ANTBDY: CPT

## 2020-06-26 PROCEDURE — 87491 CHLMYD TRACH DNA AMP PROBE: CPT

## 2020-06-26 PROCEDURE — 82306 VITAMIN D 25 HYDROXY: CPT

## 2020-06-26 PROCEDURE — 36415 COLL VENOUS BLD VENIPUNCTURE: CPT

## 2020-06-26 PROCEDURE — 85025 COMPLETE CBC W/AUTO DIFF WBC: CPT

## 2020-06-26 PROCEDURE — 87389 HIV-1 AG W/HIV-1&-2 AB AG IA: CPT

## 2020-06-28 LAB
HSV1 GG IGG SER-ACNC: 27.2 IV
HSV1+2 IGG SER IA-ACNC: 20.2 IV
HSV2 GG IGG SER-ACNC: 1.17 IV

## 2020-06-30 ENCOUNTER — TELEMEDICINE (OUTPATIENT)
Dept: MEDICAL GROUP | Facility: PHYSICIAN GROUP | Age: 30
End: 2020-06-30
Payer: COMMERCIAL

## 2020-06-30 VITALS — BODY MASS INDEX: 32.2 KG/M2 | HEIGHT: 62 IN | TEMPERATURE: 98 F | WEIGHT: 175 LBS

## 2020-06-30 DIAGNOSIS — R89.4 POSITIVE TEST FOR HERPES SIMPLEX VIRUS (HSV) ANTIBODY: ICD-10-CM

## 2020-06-30 DIAGNOSIS — E55.9 VITAMIN D INSUFFICIENCY: ICD-10-CM

## 2020-06-30 DIAGNOSIS — E78.5 DYSLIPIDEMIA: ICD-10-CM

## 2020-06-30 PROCEDURE — 99214 OFFICE O/P EST MOD 30 MIN: CPT | Mod: 95,CR | Performed by: NURSE PRACTITIONER

## 2020-06-30 ASSESSMENT — FIBROSIS 4 INDEX: FIB4 SCORE: 0.38

## 2020-06-30 NOTE — PATIENT INSTRUCTIONS
Genital Herpes  Genital herpes is a common sexually transmitted infection (STI) that is caused by a virus. The virus spreads from person to person through sexual contact. Infection can cause itching, blisters, and sores around the genitals or rectum. Symptoms may last several days and then go away This is called an outbreak. However, the virus remains in your body, so you may have more outbreaks in the future. The time between outbreaks varies and can be months or years.  Genital herpes affects men and women. It is particularly concerning for pregnant women because the virus can be passed to the baby during delivery and can cause serious problems. Genital herpes is also a concern for people who have a weak disease-fighting (immune) system.  What are the causes?  This condition is caused by the herpes simplex virus (HSV) type 1 or type 2. The virus may spread through:  · Sexual contact with an infected person, including vaginal, anal, and oral sex.  · Contact with fluid from a herpes sore.  · The skin. This means that you can get herpes from an infected partner even if he or she does not have a visible sore or does not know that he or she is infected.  What increases the risk?  You are more likely to develop this condition if:  · You have sex with many partners.  · You do not use latex condoms during sex.  What are the signs or symptoms?  Most people do not have symptoms (asymptomatic) or have mild symptoms that may be mistaken for other skin problems. Symptoms may include:  · Small red bumps near the genitals, rectum, or mouth. These bumps turn into blisters and then turn into sores.  · Flu-like symptoms, including:  ? Fever.  ? Body aches.  ? Swollen lymph nodes.  ? Headache.  · Painful urination.  · Pain and itching in the genital area or rectal area.  · Vaginal discharge.  · Tingling or shooting pain in the legs and buttocks.  Generally, symptoms are more severe and last longer during the first (primary)  outbreak. Flu-like symptoms are also more common during the primary outbreak.  How is this diagnosed?  Genital herpes may be diagnosed based on:  · A physical exam.  · Your medical history.  · Blood tests.  · A test of a fluid sample (culture) from an open sore.  How is this treated?  There is no cure for this condition, but treatment with antiviral medicines that are taken by mouth (orally) can do the following:  · Speed up healing and relieve symptoms.  · Help to reduce the spread of the virus to sexual partners.  · Limit the chance of future outbreaks, or make future outbreaks shorter.  · Lessen symptoms of future outbreaks.  Your health care provider may also recommend pain relief medicines, such as aspirin or ibuprofen.  Follow these instructions at home:  Sexual activity  · Do not have sexual contact during active outbreaks.  · Practice safe sex. Latex condoms and female condoms may help prevent the spread of the herpes virus.  General instructions  · Keep the affected areas dry and clean.  · Take over-the-counter and prescription medicines only as told by your health care provider.  · Avoid rubbing or touching blisters and sores. If you do touch blisters or sores:  ? Wash your hands thoroughly with soap and water.  ? Do not touch your eyes afterward.  · To help relieve pain or itching, you may take the following actions as directed by your health care provider:  ? Apply a cold, wet cloth (cold compress) to affected areas 4-6 times a day.  ? Apply a substance that protects your skin and reduces bleeding (astringent).  ? Apply a gel that helps relieve pain around sores (lidocaine gel).  ? Take a warm, shallow bath that cleans the genital area (sitz bath).  · Keep all follow-up visits as told by your health care provider. This is important.  How is this prevented?  · Use condoms. Although anyone can get genital herpes during sexual contact, even with the use of a condom, a condom can provide some  protection.  · Avoid having multiple sexual partners.  · Talk with your sexual partner about any symptoms either of you may have. Also, talk with your partner about any history of STIs.  · Get tested for STIs before you have sex. Ask your partner to do the same.  · Do not have sexual contact if you have symptoms of genital herpes.  Contact a health care provider if:  · Your symptoms are not improving with medicine.  · Your symptoms return.  · You have new symptoms.  · You have a fever.  · You have abdominal pain.  · You have redness, swelling, or pain in your eye.  · You notice new sores on other parts of your body.  · You are a woman and experience bleeding between menstrual periods.  · You have had herpes and you become pregnant or plan to become pregnant.  Summary  · Genital herpes is a common sexually transmitted infection (STI) that is caused by the herpes simplex virus (HSV) type 1 or type 2.  · These viruses are most often spread through sexual contact with an infected person.  · You are more likely to develop this condition if you have sex with many partners or you have unprotected sex.  · Most people do not have symptoms (asymptomatic) or have mild symptoms that may be mistaken for other skin problems. Symptoms occur as outbreaks that may happen months or years apart.  · There is no cure for this condition, but treatment with oral antiviral medicines can reduce symptoms, reduce the chance of spreading the virus to a partner, prevent future outbreaks, or shorten future outbreaks.  This information is not intended to replace advice given to you by your health care provider. Make sure you discuss any questions you have with your health care provider.  Document Released: 12/15/2001 Document Revised: 06/24/2019 Document Reviewed: 11/17/2017  [employer] Interactive Patient Education © 2020 [employer]  Herpes Labialis  You have a fever blister or cold sore (herpes labialis). These painful, grouped sores are caused by one of  the herpes viruses (HSV1 most commonly). They are usually found around the lips and mouth, but the same infection can also affect other areas on the face such as the nose and eyes. Herpes infections take about 10 days to heal. They often occur again and again in the same spot. Other symptoms may include numbness and tingling in the involved skin, achiness, fever, and swollen glands in the neck. Colds, emotional stress, injuries, or excess sunlight exposure all seem to make herpes reappear. Herpes lip infections are contagious. Direct contact with these sores can spread the infection. It can also be spread to other parts of your own body.  TREATMENT   Herpes labialis is usually self-limited and resolves within 1 week. To reduce pain and swelling, apply ice packs frequently to the sores or suck on popsicles or frozen juice bars. Antiviral medicine may be used by mouth to shorten the duration of the breakout. Avoid spreading the infection by washing your hands often. Be careful not to touch your eyes or genital areas after handling the infected blisters. Do not kiss or have other intimate contact with others. After the blisters are completely healed you may resume contact. Use sunscreen to lessen recurrences.   If this is your first infection with herpes, or if you have a severe or repeated infections, your caregiver may prescribe one of the anti-viral drugs to speed up the healing. If you have sun-related flare-ups despite the use of sunscreen, starting oral anti-viral medicine before a prolonged exposure (going skiing or to the beach) can prevent most episodes.   SEEK IMMEDIATE MEDICAL CARE IF:  · You develop a headache, sleepiness, high fever, vomiting, or severe weakness.  · You have eye irritation, pain, blurred vision or redness.  · You develop a prolonged infection not getting better in 10 days.     This information is not intended to replace advice given to you by your health care provider. Make sure you  discuss any questions you have with your health care provider.     Document Released: 12/18/2006 Document Revised: 03/11/2013 Document Reviewed: 10/22/2010  Elsevier Interactive Patient Education ©2016 [employer]

## 2020-06-30 NOTE — PROGRESS NOTES
Telemedicine Visit: Established Patient     This encounter was conducted via Zoom .   Verbal consent was obtained. Patient's identity was verified.    Subjective:     Chief Complaint   Patient presents with   • Results     Labs      Deysi Goodrich is a 29 y.o. female presenting for evaluation and management of: Lab review    Dyslipidemia  This is a new problem to the examiner. Chronic, ongoing. Not on medication for this issue. Due to repeat labs in June 2021.   6/12/2012 07:44 9/30/2016 09:05 2/15/2018 09:15 6/26/2020 07:10   Cholesterol,Tot 129 169 141 177   Triglycerides 39 76 54 134   HDL 45 38 (A) 43 45   LDL 76 116 (H) 87 105 (H)       Vitamin D insufficiency  Chronic, ongoing.  Denies any muscle weakness.  No history of CKD.  No history of IBD's, celiac, CF, or surgeries causing malabsorption.  Patient is overweight.  Is taking vitamin d 5000 units/day.   Due for updated lab work June 2021.   9/30/2016 09:05 2/15/2018 09:15 6/26/2020 07:10   25-Hydroxy   Vitamin D 25 29 (L) 12 (L) 41       Positive test for herpes simplex virus (HSV) antibody  This is a new problem to the examiner. Patient concerned about recent results showing positive antibodies. Reports that she has anxiety regarding the results. States that she is in a monogamous relationship for the past 2 years. Denies any genital rashes or symptoms. Does reports a history of oral cold sores.   5/11/2019 11:11 6/26/2020 07:10   Hsv I-Ii Igg Antibodies 1.02 20.20   HSV1 Gly IgG  27.20 (H)   HSV2 Gly IgG  1.17 (H)     ROS   Denies any recent fevers or chills. No nausea or vomiting. No chest pains or shortness of breath.     Allergies   Allergen Reactions   • Nkda [No Known Drug Allergy]      Current medicines (including changes today)  Current Outpatient Medications   Medication Sig Dispense Refill   • fluoxetine (PROZAC) 40 MG capsule TAKE 1 CAPSULE BY MOUTH EVERY DAY 90 Cap 3   • tizanidine (ZANAFLEX) 4 MG Tab TAKE 1 TABLET BY MOUTH EVERY 12 HOURS AS  NEEDED FOR MUSCLE STIFFNESS 180 Tab 3   • busPIRone (BUSPAR) 10 MG Tab tablet Take 1 Tab by mouth 2 times a day. 180 Tab 3   • Cyanocobalamin (VITAMIN B-12) 3000 MCG SL Tab Place  under tongue.     • Cholecalciferol (VITAMIN D-3) 5000 units Tab Take  by mouth.       No current facility-administered medications for this visit.      Patient Active Problem List    Diagnosis Date Noted   • Positive test for herpes simplex virus (HSV) antibody 07/02/2020   • FAUSTINO (generalized anxiety disorder) 06/08/2018   • Headache 03/29/2018   • PTSD (post-traumatic stress disorder) 02/03/2018   • Overweight 02/01/2018   • Vitamin D insufficiency 02/01/2018   • Dyslipidemia 02/01/2018   • Major depressive disorder 02/01/2018   • Panic attacks 07/01/2013   • Mild dysplasia of cervix (KAREL I) 03/16/2010     Family History   Problem Relation Age of Onset   • Cancer Mother         Cervical   • Depression Mother    • Anxiety disorder Mother    • Alcohol/Drug Mother    • Hypertension Father    • Anxiety disorder Father    • Heart Disease Father    • Alcohol abuse Father    • Hyperlipidemia Father    • No Known Problems Sister    • Alcohol/Drug Maternal Uncle    • Anxiety disorder Paternal Aunt    • Depression Paternal Aunt    • Colon Cancer Paternal Aunt 58   • Alcohol abuse Paternal Uncle    • Depression Maternal Grandmother    • Anxiety disorder Maternal Grandmother    • Heart Attack Maternal Grandfather    • Alcohol abuse Maternal Grandfather    • Depression Paternal Grandmother    • Anxiety disorder Paternal Grandmother    • Alcohol abuse Paternal Grandmother    • Diabetes Paternal Grandfather    • Hypertension Paternal Grandfather    • Alcohol abuse Paternal Grandfather    • Anxiety disorder Paternal Aunt    • Depression Paternal Aunt    • Alcohol abuse Paternal Aunt    • Anxiety disorder Paternal Aunt    • Depression Paternal Aunt    • Alcohol/Drug Maternal Uncle    • Stroke Neg Hx      She  has a past medical history of Anxiety,  "Bleeding nose (chronic), Depression, Migraine ([id number]), Mild dysplasia of cervix (KAREL I) (3/16/2010), Palpitations (6/5/2012), PTSD (post-traumatic stress disorder), and Syncope (6/5/2012).  She  has a past surgical history that includes dental extraction(s) (2006).     Objective:   Temp 36.7 °C (98 °F) (Temporal)   Ht 1.575 m (5' 2\")   Wt 79.4 kg (175 lb)   LMP 06/09/2020   BMI 32.01 kg/m²     Physical Exam:  Constitutional: Alert, no distress, well-groomed.  Skin: No rashes in visible areas.  Eye: Round. Conjunctiva clear, lids normal. No icterus.   ENMT: Lips pink without lesions, good dentition, moist mucous membranes. Phonation normal.  Neck: No masses, no thyromegaly. Moves freely without pain.  Respiratory: Unlabored respiratory effort, no cough or audible wheeze  Psych: Alert and oriented x3, normal affect and mood.     Assessment and Plan:   The following treatment plan was discussed:     1. Dyslipidemia  Chronic, ongoing.   Encouraged diet high in fruits, vegetables, and fiber. And a diet low in salt, refined carbohydrates, cholesterol, saturated fat, and trans fatty acids.    Encouraged a minimum of 30 minutes of moderate intensity aerobic exercise (eg, brisk walking) is recommended on five days each week. Or 30 minutes of vigorous-intensity aerobic exercise (eg, jogging) on three days each week.   Due for updated lab work in June 2021.    2. Vitamin D insufficiency  Chronic, ongoing.  Continue over-the-counter vitamin D and calcium supplement daily.  Due for updated lab work in June 2021.    3. Positive test for herpes simplex virus (HSV) antibody  Patient reports that she is asymptomatic.   Reviewed recent lab results with patient. Clarified with the patient that a positive result does not give information on time frame of exposure, just that there has been exposure in the past.  Educated that she does not need medication daily for problem as long as she is asymptomatic.  Advised patient " signs/symptoms to monitor for, contact me if symptoms develop.    Follow-up: Return if symptoms worsen or fail to improve.         Please note that this dictation was created using voice recognition software. I have worked with consultants from the vendor as well as technical experts from [facility] to optimize the interface. I have made every reasonable attempt to correct obvious errors, but I expect that there are errors of grammar and possibly content that I did not discover before finalizing the note.

## 2020-07-02 PROBLEM — R89.4 POSITIVE TEST FOR HERPES SIMPLEX VIRUS (HSV) ANTIBODY: Status: ACTIVE | Noted: 2020-07-02

## 2020-07-02 NOTE — ASSESSMENT & PLAN NOTE
This is a new problem to the examiner. Chronic, ongoing. Not on medication for this issue. Due to repeat labs in June 2021.   6/12/2012 07:44 9/30/2016 09:05 2/15/2018 09:15 6/26/2020 07:10   Cholesterol,Tot 129 169 141 177   Triglycerides 39 76 54 134   HDL 45 38 (A) 43 45   LDL 76 116 (H) 87 105 (H)

## 2020-07-02 NOTE — ASSESSMENT & PLAN NOTE
Chronic, ongoing.  Denies any muscle weakness.  No history of CKD.  No history of IBD's, celiac, CF, or surgeries causing malabsorption.  Patient is overweight.  Is taking vitamin d 5000 units/day.   Due for updated lab work June 2021.   9/30/2016 09:05 2/15/2018 09:15 6/26/2020 07:10   25-Hydroxy   Vitamin D 25 29 (L) 12 (L) 41

## 2020-07-02 NOTE — ASSESSMENT & PLAN NOTE
This is a new problem to the examiner. Patient concerned about recent results showing positive antibodies. Reports that she has anxiety regarding the results. States that she is in a monogamous relationship for the past 2 years. Denies any genital rashes or symptoms. Does reports a history of oral cold sores.   5/11/2019 11:11 6/26/2020 07:10   Hsv I-Ii Igg Antibodies 1.02 20.20   HSV1 Gly IgG  27.20 (H)   HSV2 Gly IgG  1.17 (H)

## 2020-09-19 DIAGNOSIS — G44.229 CHRONIC TENSION-TYPE HEADACHE, NOT INTRACTABLE: ICD-10-CM

## 2020-09-19 DIAGNOSIS — F41.1 GAD (GENERALIZED ANXIETY DISORDER): ICD-10-CM

## 2020-09-21 NOTE — TELEPHONE ENCOUNTER
Received request via: Pharmacy    Was the patient seen in the last year in this department? Yes 4/29/20    Does the patient have an active prescription (recently filled or refills available) for medication(s) requested? No

## 2020-09-22 RX ORDER — BUSPIRONE HYDROCHLORIDE 10 MG/1
TABLET ORAL
Qty: 180 TAB | Refills: 3 | Status: SHIPPED | OUTPATIENT
Start: 2020-09-22 | End: 2021-12-13 | Stop reason: SDUPTHER

## 2020-09-22 RX ORDER — TIZANIDINE 4 MG/1
TABLET ORAL
Qty: 180 TAB | Refills: 0 | Status: SHIPPED | OUTPATIENT
Start: 2020-09-22 | End: 2020-12-22 | Stop reason: SDUPTHER

## 2020-09-23 DIAGNOSIS — B00.9 HERPES: ICD-10-CM

## 2020-09-23 RX ORDER — ACYCLOVIR 400 MG/1
400 TABLET ORAL 3 TIMES DAILY
Qty: 21 TAB | Refills: 1 | Status: SHIPPED | OUTPATIENT
Start: 2020-09-23 | End: 2020-09-30

## 2020-09-23 NOTE — TELEPHONE ENCOUNTER
Requested Prescriptions     Pending Prescriptions Disp Refills   • tizanidine (ZANAFLEX) 4 MG Tab [Pharmacy Med Name: TIZANIDINE 4MG TABLETS] 180 Tab 0     Sig: TAKE 1 TABLET BY MOUTH EVERY 12 HOURS AS NEEDED FOR MUSCLE STIFFNESS   • busPIRone (BUSPAR) 10 MG Tab tablet [Pharmacy Med Name: BUSPIRONE 10MG TABLETS] 180 Tab 3     Sig: TAKE 1 TABLET BY MOUTH TWICE DAILY       RICARDO Solis.

## 2020-09-23 NOTE — PROGRESS NOTES
1. Herpes  - acyclovir (ZOVIRAX) 400 MG tablet; Take 1 Tab by mouth 3 times a day for 7 days.  Dispense: 21 Tab; Refill: 1

## 2020-12-21 DIAGNOSIS — F41.1 GAD (GENERALIZED ANXIETY DISORDER): ICD-10-CM

## 2020-12-21 DIAGNOSIS — F33.1 MODERATE EPISODE OF RECURRENT MAJOR DEPRESSIVE DISORDER (HCC): ICD-10-CM

## 2020-12-22 DIAGNOSIS — G44.229 CHRONIC TENSION-TYPE HEADACHE, NOT INTRACTABLE: ICD-10-CM

## 2020-12-22 RX ORDER — TIZANIDINE 4 MG/1
4 TABLET ORAL EVERY 12 HOURS PRN
Qty: 180 TAB | Refills: 0 | Status: SHIPPED | OUTPATIENT
Start: 2020-12-22

## 2020-12-22 RX ORDER — FLUOXETINE HYDROCHLORIDE 40 MG/1
40 CAPSULE ORAL
Qty: 90 CAP | Refills: 1 | Status: SHIPPED | OUTPATIENT
Start: 2020-12-22 | End: 2021-12-13 | Stop reason: SDUPTHER

## 2020-12-22 NOTE — TELEPHONE ENCOUNTER
Received request via: Pharmacy    Was the patient seen in the last year in this department? Yes 06/30/2020    Does the patient have an active prescription (recently filled or refills available) for medication(s) requested? No

## 2020-12-22 NOTE — TELEPHONE ENCOUNTER
Received request via: Pharmacy    Was the patient seen in the last year in this department? Yes 6/30/20    Does the patient have an active prescription (recently filled or refills available) for medication(s) requested? No

## 2020-12-22 NOTE — TELEPHONE ENCOUNTER
Requested Prescriptions     Pending Prescriptions Disp Refills   • fluoxetine (PROZAC) 40 MG capsule 90 Cap 1     Sig: Take 1 Cap by mouth every day.       RICARDO Solis.

## 2020-12-23 NOTE — TELEPHONE ENCOUNTER
Requested Prescriptions     Pending Prescriptions Disp Refills   • tizanidine (ZANAFLEX) 4 MG Tab 180 Tab 0     Sig: Take 1 Tab by mouth every 12 hours as needed (muscle stiffness).       Zahira Keating A.P.R.N.

## 2021-04-20 DIAGNOSIS — R89.4 POSITIVE TEST FOR HERPES SIMPLEX VIRUS (HSV) ANTIBODY: ICD-10-CM

## 2021-04-20 RX ORDER — VALACYCLOVIR HYDROCHLORIDE 500 MG/1
500 TABLET, FILM COATED ORAL DAILY
Qty: 90 TABLET | Refills: 3 | Status: SHIPPED | OUTPATIENT
Start: 2021-04-20 | End: 2022-12-11

## 2021-04-20 NOTE — PROGRESS NOTES
1. Positive test for herpes simplex virus (HSV) antibody  - valACYclovir (VALTREX) 500 MG Tab; Take 1 tablet by mouth every day.  Dispense: 90 tablet; Refill: 3

## 2021-09-15 DIAGNOSIS — G44.229 CHRONIC TENSION-TYPE HEADACHE, NOT INTRACTABLE: ICD-10-CM

## 2021-09-15 RX ORDER — TIZANIDINE 4 MG/1
TABLET ORAL
Qty: 180 TABLET | Refills: 0 | OUTPATIENT
Start: 2021-09-15

## 2021-09-15 NOTE — TELEPHONE ENCOUNTER
Received request via: Pharmacy    Was the patient seen in the last year in this department? No LOV 6/30/2020    Does the patient have an active prescription (recently filled or refills available) for medication(s) requested? No

## 2021-11-01 ENCOUNTER — HOSPITAL ENCOUNTER (OUTPATIENT)
Facility: MEDICAL CENTER | Age: 31
End: 2021-11-01
Attending: SPECIALIST
Payer: COMMERCIAL

## 2021-11-01 PROCEDURE — 87491 CHLMYD TRACH DNA AMP PROBE: CPT

## 2021-11-01 PROCEDURE — 87624 HPV HI-RISK TYP POOLED RSLT: CPT

## 2021-11-01 PROCEDURE — 88175 CYTOPATH C/V AUTO FLUID REDO: CPT

## 2021-11-01 PROCEDURE — 87591 N.GONORRHOEAE DNA AMP PROB: CPT

## 2021-11-02 LAB — AMBIGUOUS DTTM AMBI4: NORMAL

## 2021-12-08 DIAGNOSIS — F33.1 MODERATE EPISODE OF RECURRENT MAJOR DEPRESSIVE DISORDER (HCC): ICD-10-CM

## 2021-12-08 DIAGNOSIS — F41.1 GAD (GENERALIZED ANXIETY DISORDER): ICD-10-CM

## 2021-12-08 RX ORDER — BUSPIRONE HYDROCHLORIDE 10 MG/1
TABLET ORAL
Qty: 180 TABLET | Refills: 3 | OUTPATIENT
Start: 2021-12-08

## 2021-12-08 RX ORDER — FLUOXETINE HYDROCHLORIDE 40 MG/1
CAPSULE ORAL
Qty: 90 CAPSULE | Refills: 1 | OUTPATIENT
Start: 2021-12-08

## 2021-12-08 NOTE — TELEPHONE ENCOUNTER
Received request via:  Pharmacy    Was the patient seen in the last year in this department? No     Does the patient have an active prescription (recently filled or refills available) for medication(s) requested? No

## 2021-12-09 NOTE — TELEPHONE ENCOUNTER
I called Patient to let her know her medications have been denied,pharmacy request came in from [place] she stated she has not been to [place] in the last decade, will you please approve these medications as the patient has an appointment on 12/23/21.

## 2021-12-13 DIAGNOSIS — F41.1 GAD (GENERALIZED ANXIETY DISORDER): ICD-10-CM

## 2021-12-13 DIAGNOSIS — F33.1 MODERATE EPISODE OF RECURRENT MAJOR DEPRESSIVE DISORDER (HCC): ICD-10-CM

## 2021-12-13 DIAGNOSIS — F41.1 GENERALIZED ANXIETY DISORDER: ICD-10-CM

## 2021-12-13 DIAGNOSIS — R45.4 ANGER REACTION: ICD-10-CM

## 2021-12-13 DIAGNOSIS — F51.04 PSYCHOPHYSIOLOGICAL INSOMNIA: ICD-10-CM

## 2021-12-13 DIAGNOSIS — F22 PARANOIA (HCC): ICD-10-CM

## 2021-12-13 DIAGNOSIS — F41.0 PANIC ATTACK: ICD-10-CM

## 2021-12-13 DIAGNOSIS — F43.10 POSTTRAUMATIC STRESS DISORDER: ICD-10-CM

## 2021-12-13 RX ORDER — BUSPIRONE HYDROCHLORIDE 10 MG/1
10 TABLET ORAL 2 TIMES DAILY
Qty: 180 TABLET | Refills: 1 | Status: SHIPPED | OUTPATIENT
Start: 2021-12-13 | End: 2022-02-03 | Stop reason: SDUPTHER

## 2021-12-13 RX ORDER — FLUOXETINE HYDROCHLORIDE 40 MG/1
40 CAPSULE ORAL
Qty: 90 CAPSULE | Refills: 1 | Status: SHIPPED | OUTPATIENT
Start: 2021-12-13 | End: 2022-02-03 | Stop reason: SDUPTHER

## 2021-12-14 NOTE — PROGRESS NOTES
Requested Prescriptions     Signed Prescriptions Disp Refills   • fluoxetine (PROZAC) 40 MG capsule 90 Capsule 1     Sig: Take 1 Capsule by mouth every day.   • busPIRone (BUSPAR) 10 MG Tab tablet 180 Tablet 1     Sig: Take 1 Tablet by mouth 2 times a day.       RICARDO Solis.

## 2021-12-14 NOTE — PROGRESS NOTES
1. FAUSTINO (generalized anxiety disorder)  - Referral to Behavioral Health    2. Moderate episode of recurrent major depressive disorder (HCC)  - Referral to Behavioral Health    3. Panic attacks  - Referral to Behavioral Health    4. PTSD (post-traumatic stress disorder)  - Referral to Behavioral Health    5. Psychophysiological insomnia  - Referral to Behavioral Health    6. Anger reaction  - Referral to Behavioral Health    7. Paranoia (HCC)  - Referral to Behavioral Health

## 2021-12-23 ENCOUNTER — OFFICE VISIT (OUTPATIENT)
Dept: MEDICAL GROUP | Facility: PHYSICIAN GROUP | Age: 31
End: 2021-12-23
Payer: COMMERCIAL

## 2021-12-23 VITALS
DIASTOLIC BLOOD PRESSURE: 70 MMHG | TEMPERATURE: 96.8 F | OXYGEN SATURATION: 99 % | WEIGHT: 190 LBS | HEIGHT: 63 IN | BODY MASS INDEX: 33.66 KG/M2 | HEART RATE: 80 BPM | SYSTOLIC BLOOD PRESSURE: 124 MMHG

## 2021-12-23 DIAGNOSIS — E55.9 VITAMIN D INSUFFICIENCY: ICD-10-CM

## 2021-12-23 DIAGNOSIS — F33.1 MODERATE EPISODE OF RECURRENT MAJOR DEPRESSIVE DISORDER (HCC): ICD-10-CM

## 2021-12-23 DIAGNOSIS — Z00.00 ROUTINE HEALTH MAINTENANCE: ICD-10-CM

## 2021-12-23 DIAGNOSIS — E78.5 DYSLIPIDEMIA: ICD-10-CM

## 2021-12-23 DIAGNOSIS — Z00.01 ENCOUNTER FOR WELL ADULT EXAM WITH ABNORMAL FINDINGS: ICD-10-CM

## 2021-12-23 DIAGNOSIS — E66.09 CLASS 1 OBESITY DUE TO EXCESS CALORIES WITH SERIOUS COMORBIDITY AND BODY MASS INDEX (BMI) OF 33.0 TO 33.9 IN ADULT: ICD-10-CM

## 2021-12-23 PROBLEM — E66.811 CLASS 1 OBESITY DUE TO EXCESS CALORIES WITH SERIOUS COMORBIDITY AND BODY MASS INDEX (BMI) OF 33.0 TO 33.9 IN ADULT: Status: ACTIVE | Noted: 2018-02-01

## 2021-12-23 PROCEDURE — 99395 PREV VISIT EST AGE 18-39: CPT | Performed by: NURSE PRACTITIONER

## 2021-12-23 RX ORDER — BUPROPION HYDROCHLORIDE 150 MG/1
150 TABLET ORAL EVERY MORNING
Qty: 90 TABLET | Refills: 0 | Status: SHIPPED | OUTPATIENT
Start: 2021-12-23 | End: 2022-02-03 | Stop reason: SDUPTHER

## 2021-12-23 ASSESSMENT — PATIENT HEALTH QUESTIONNAIRE - PHQ9
8. MOVING OR SPEAKING SO SLOWLY THAT OTHER PEOPLE COULD HAVE NOTICED. OR THE OPPOSITE, BEING SO FIGETY OR RESTLESS THAT YOU HAVE BEEN MOVING AROUND A LOT MORE THAN USUAL: MORE THAN HALF THE DAYS
7. TROUBLE CONCENTRATING ON THINGS, SUCH AS READING THE NEWSPAPER OR WATCHING TELEVISION: NEARLY EVERY DAY
SUM OF ALL RESPONSES TO PHQ QUESTIONS 1-9: 19
9. THOUGHTS THAT YOU WOULD BE BETTER OFF DEAD, OR OF HURTING YOURSELF: NOT AT ALL
3. TROUBLE FALLING OR STAYING ASLEEP OR SLEEPING TOO MUCH: NEARLY EVERY DAY
2. FEELING DOWN, DEPRESSED, IRRITABLE, OR HOPELESS: MORE THAN HALF THE DAYS
SUM OF ALL RESPONSES TO PHQ9 QUESTIONS 1 AND 2: 4
1. LITTLE INTEREST OR PLEASURE IN DOING THINGS: MORE THAN HALF THE DAYS
6. FEELING BAD ABOUT YOURSELF - OR THAT YOU ARE A FAILURE OR HAVE LET YOURSELF OR YOUR FAMILY DOWN: MORE THAN HALF THE DAYS
5. POOR APPETITE OR OVEREATING: NEARLY EVERY DAY
4. FEELING TIRED OR HAVING LITTLE ENERGY: MORE THAN HALF THE DAYS

## 2021-12-23 ASSESSMENT — FIBROSIS 4 INDEX: FIB4 SCORE: 0.41

## 2021-12-23 NOTE — ASSESSMENT & PLAN NOTE
"Chronic, uncontrolled. Continues fluoxetine 40 mg per day and buspirone 10 mg twice daily. Reports that she will feel really good but only for brief periods of time. The slightest inconvenience will cause her to come back down.  Patient states that she used to feel that she was more on her center and could feel emotions with fluoxetine, but now she does not feel like she can get excited or find her center.  States that she cannot remember the last time she laughed or felt good.  Patient uses an example of yesterday at work she participated in a gift exchange, reports that she really did not care to participate but she did because she felt that that is what she was supposed to do.  She did not get the reaction regarding the gifts that she gave a person and she immediately thought that she did something wrong or upset someone which causes her to go down a rabbit hole focusing on this thought and she ended up crying at her desk.  She noticed that about 3 to 4 months ago her symptoms started progressively worsening, but over the last 6 weeks her anger became more of an issue.  She has noticed also that recently she wants to drink alcohol more often.  She feels that when she drinks alcohol it can help \"quiet down the busyness\", which concerns her as she does have a family history of alcohol abuse/addiction.  She does not want to turn to alcohol to make her feel better.  She was previously following with a therapist every 2 weeks, but she is not currently.  She does still have this option available but she is waiting to see what will happen with the referral to behavioral health that was placed to [facility] on 12/13/2021.  She has recently gotten back into swimming which does help her symptoms, but she is feeling more irritable and paranoid which is causing it to be more difficult to get out.  The patient denies any suicidal ideations or thoughts of harming herself or others.   Depression Screen " (PHQ-2/PHQ-9) 3/29/2018 8/26/2019 12/23/2021   PHQ-2 Total Score 4 - -   PHQ-2 Total Score - 2 4   PHQ-2 Total Score - - -   PHQ-9 Total Score 14 - -   PHQ-9 Total Score - 6 19   PHQ-9 Total Score - - -     Interpretation of PHQ-9 Total Score   Score Severity   1-4 No Depression   5-9 Mild Depression   10-14 Moderate Depression   15-19 Moderately Severe Depression   20-27 Severe Depression

## 2021-12-24 NOTE — PROGRESS NOTES
"Subjective:   CC:   Chief Complaint   Patient presents with   • Annual Exam     HPI:   Deysi Goodrich is a 31 y.o. female who presents for annual exam    Moderate episode of recurrent major depressive disorder (HCC)  Chronic, uncontrolled. Continues fluoxetine 40 mg per day and buspirone 10 mg twice daily. Reports that she will feel really good but only for brief periods of time. The slightest inconvenience will cause her to come back down.  Patient states that she used to feel that she was more on her center and could feel emotions with fluoxetine, but now she does not feel like she can get excited or find her center.  States that she cannot remember the last time she laughed or felt good.  Patient uses an example of yesterday at work she participated in a gift exchange, reports that she really did not care to participate but she did because she felt that that is what she was supposed to do.  She did not get the reaction regarding the gifts that she gave a person and she immediately thought that she did something wrong or upset someone which causes her to go down a rabbit hole focusing on this thought and she ended up crying at her desk.  She noticed that about 3 to 4 months ago her symptoms started progressively worsening, but over the last 6 weeks her anger became more of an issue.  She has noticed also that recently she wants to drink alcohol more often.  She feels that when she drinks alcohol it can help \"quiet down the busyness\", which concerns her as she does have a family history of alcohol abuse/addiction.  She does not want to turn to alcohol to make her feel better.  She was previously following with a therapist every 2 weeks, but she is not currently.  She does still have this option available but she is waiting to see what will happen with the referral to behavioral health that was placed to [facility] on 12/13/2021.  She has recently gotten back into swimming which does help her " symptoms, but she is feeling more irritable and paranoid which is causing it to be more difficult to get out.  The patient denies any suicidal ideations or thoughts of harming herself or others.   Depression Screen (PHQ-2/PHQ-9) 3/29/2018 2019 2021   PHQ-2 Total Score 4 - -   PHQ-2 Total Score - 2 4   PHQ-2 Total Score - - -   PHQ-9 Total Score 14 - -   PHQ-9 Total Score - 6 19   PHQ-9 Total Score - - -     Interpretation of PHQ-9 Total Score   Score Severity   1-4 No Depression   5-9 Mild Depression   10-14 Moderate Depression   15-19 Moderately Severe Depression   20-27 Severe Depression      Patient has GYN provider: Yes  Last Pap Smear: 2021   H/O Abnormal Pap: Yes, mild dysplasia  Last Mammogram: NA  Last Bone Density Test: NA  Last Colorectal Cancer Screening: NA  Last Tdap:   Received HPV series: No    Exercise: Swimming and walking  Diet: Could be better      Patient's last menstrual period was 2021.  Birth control: Condoms  Menses every month with 7 days with heavy 3-4, lighten up, spotting by the end bleeding.  Reports severe cramping and does take OTC analgesics for cramps.  No significant bloating/fluid retention, pelvic pain, or dyspareunia. No abnormal vaginal discharge.  No breast tenderness, mass, nipple discharge, changes in size or contour, or abnormal cyclic discomfort.    OB History    Para Term  AB Living   1 0 0 0 1 0   SAB IAB Ectopic Molar Multiple Live Births   1 0 0 0 0 0   Obstetric Comments   Miscarriage       She  reports being sexually active and has had partner(s) who are male. She reports using the following method of birth control/protection: Condom.  She  has a past medical history of Anxiety, Bleeding nose (chronic), Depression, Migraine ([id number]), Mild dysplasia of cervix (KAREL I) (3/16/2010), Palpitations (2012), PTSD (post-traumatic stress disorder), and Syncope (2012).  She  has a past surgical history that includes dental  extraction(s) ().    Family History   Problem Relation Age of Onset   • Cancer Mother         Cervical   • Depression Mother    • Anxiety disorder Mother    • Alcohol/Drug Mother    • Hypertension Father    • Anxiety disorder Father    • Heart Disease Father    • Alcohol abuse Father    • Hyperlipidemia Father    • No Known Problems Sister    • Alcohol/Drug Maternal Uncle    • Anxiety disorder Paternal Aunt    • Depression Paternal Aunt    • Colon Cancer Paternal Aunt 58   • Alcohol abuse Paternal Uncle    • Depression Maternal Grandmother    • Anxiety disorder Maternal Grandmother    • Heart Attack Maternal Grandfather    • Alcohol abuse Maternal Grandfather    • Depression Paternal Grandmother    • Anxiety disorder Paternal Grandmother    • Alcohol abuse Paternal Grandmother    • Diabetes Paternal Grandfather    • Hypertension Paternal Grandfather    • Alcohol abuse Paternal Grandfather    • Anxiety disorder Paternal Aunt    • Depression Paternal Aunt    • Alcohol abuse Paternal Aunt    • Anxiety disorder Paternal Aunt    • Depression Paternal Aunt    • Alcohol/Drug Maternal Uncle    • Stroke Neg Hx      Social History     Tobacco Use   • Smoking status: Former Smoker     Packs/day: 1.00     Years: 3.00     Pack years: 3.00     Types: Cigarettes     Quit date: 2009     Years since quittin.1   • Smokeless tobacco: Never Used   Vaping Use   • Vaping Use: Never used   Substance Use Topics   • Alcohol use: Yes     Alcohol/week: 1.2 oz     Types: 2 Shots of liquor per week     Comment: occ   • Drug use: No     Patient Active Problem List    Diagnosis Date Noted   • Positive test for herpes simplex virus (HSV) antibody 2020   • FAUSTINO (generalized anxiety disorder) 2018   • Headache 2018   • PTSD (post-traumatic stress disorder) 2018   • Class 1 obesity due to excess calories with serious comorbidity and body mass index (BMI) of 33.0 to 33.9 in adult 2018   • Vitamin D  "insufficiency 02/01/2018   • Dyslipidemia 02/01/2018   • Moderate episode of recurrent major depressive disorder (HCC) 02/01/2018   • Panic attacks 07/01/2013   • Mild dysplasia of cervix (KAREL I) 03/16/2010     Current Outpatient Medications   Medication Sig Dispense Refill   • buPROPion (WELLBUTRIN XL) 150 MG XL tablet Take 1 Tablet by mouth every morning. 90 Tablet 0   • fluoxetine (PROZAC) 40 MG capsule Take 1 Capsule by mouth every day. 90 Capsule 1   • busPIRone (BUSPAR) 10 MG Tab tablet Take 1 Tablet by mouth 2 times a day. 180 Tablet 1   • valACYclovir (VALTREX) 500 MG Tab Take 1 tablet by mouth every day. 90 tablet 3   • tizanidine (ZANAFLEX) 4 MG Tab Take 1 Tab by mouth every 12 hours as needed (muscle stiffness). 180 Tab 0   • Cyanocobalamin (VITAMIN B-12) 3000 MCG SL Tab Place  under tongue.     • Cholecalciferol (VITAMIN D-3) 5000 units Tab Take  by mouth.       No current facility-administered medications for this visit.     No Known Allergies    Review of Systems   Constitutional: Negative for fever, chills and malaise/fatigue.   HENT: Negative for congestion.    Eyes: Negative for pain.   Respiratory: Negative for cough and shortness of breath.    Cardiovascular: Negative for chest pain and leg swelling.   Gastrointestinal: Negative for nausea, vomiting, abdominal pain and diarrhea.   Genitourinary: Negative for dysuria and hematuria.   Skin: Negative for rash.   Neurological: Negative for dizziness, focal weakness and headaches.   Endo/Heme/Allergies: Does not bruise/bleed easily.   Psychiatric/Behavioral: + Depression, anxiety, anger, paranoia, panic attacks, PTSD.  The patient is not nervous/anxious.      Objective:   /70 (BP Location: Left arm, Patient Position: Sitting, BP Cuff Size: Adult)   Pulse 80   Temp 36 °C (96.8 °F) (Temporal)   Ht 1.6 m (5' 3\")   Wt 86.2 kg (190 lb)   LMP 12/16/2021   SpO2 99%   BMI 33.66 kg/m²     Wt Readings from Last 4 Encounters:   12/23/21 86.2 kg (190 " lb)   06/30/20 79.4 kg (175 lb)   04/29/20 73 kg (161 lb)   02/28/20 72.6 kg (160 lb)     Physical Exam:  Constitutional: Well-developed and well-nourished. Not diaphoretic. No distress.   Skin: Skin is warm and dry. No rash noted.  Head: Atraumatic without lesions.  Eyes: Conjunctivae and extraocular motions are normal. Pupils are equal, round, and reactive to light. No scleral icterus.   Ears:  External ears unremarkable. Tympanic membranes clear and intact.  Nose: Nares patent. Septum midline. Turbinates without erythema nor edema. No discharge.   Mouth/Throat: Tongue normal. Oropharynx is clear and moist. Posterior pharynx without erythema or exudates.  Neck: Supple, trachea midline. Normal range of motion. No thyromegaly present. No lymphadenopathy--cervical or supraclavicular.  Cardiovascular: Regular rate and rhythm, S1 and S2 without murmur, rubs, or gallops.    Respiratory: Effort normal. Clear to auscultation throughout. No adventitious sounds.   Breast:  Breast exam deferred. Discussed monthly self exams and what to look for, including peau d'orange or nipple retraction, discharge, breasts moving freely and equally without restriction, axillary/supraclavicular adenopathy, or palpable masses/nodules.  Abdomen: Soft, non tender, and without distention. Active bowel sounds in all four quadrants. No rebound, guarding.  Extremities: No cyanosis, clubbing, erythema, nor edema. Radial pulses intact and symmetric.   Musculoskeletal: All major joints AROM full in all directions without pain.  Neurological: Alert and oriented x 3. Grossly non-focal. Strength and sensation grossly intact.   Psychiatric:  Behavior, mood, and affect are appropriate.    Assessment and Plan:   1. Encounter for well adult exam with abnormal findings    2. Moderate episode of recurrent major depressive disorder (HCC)  Chronic, currently uncontrolled.  Patient was referred to behavioral health on 12/13/2021, referral information provided  to patient so that she may call and schedule appointment.  Continue fluoxetine 40 mg daily and buspirone 10 mg twice daily.  Plan to start Wellbutrin  mg/day.  Advised patient to contact provider if symptoms worsen with the addition of medication.  Side effects may occur for a couple of weeks, but they tend to improve with time.  Plan to follow-up in 6 weeks to review medication and symptoms.  Plan to complete annual labs prior to follow-up.  Be seen in emergency room if any new thoughts of harming self or suicidal ideations occur.  - CBC WITH DIFFERENTIAL; Future  - Comp Metabolic Panel; Future  - TSH WITH REFLEX TO FT4; Future  - buPROPion (WELLBUTRIN XL) 150 MG XL tablet; Take 1 Tablet by mouth every morning.  Dispense: 90 Tablet; Refill: 0    3. Dyslipidemia  Due for updated annual labs prior to follow-up.  - Comp Metabolic Panel; Future  - Lipid Profile; Future    4. Class 1 obesity due to excess calories with serious comorbidity and body mass index (BMI) of 33.0 to 33.9 in adult  Due for updated annual labs prior to follow-up.  - CBC WITH DIFFERENTIAL; Future  - Comp Metabolic Panel; Future  - Lipid Profile; Future  - TSH WITH REFLEX TO FT4; Future  - Patient identified as having weight management issue.  Appropriate orders and counseling given.    5. Vitamin D insufficiency  Due for updated annual labs prior to follow-up.  - VITAMIN D,25 HYDROXY; Future    6. Routine health maintenance  Due for updated annual labs prior to follow-up.  - CBC WITH DIFFERENTIAL; Future  - Comp Metabolic Panel; Future  - Lipid Profile; Future  - VITAMIN D,25 HYDROXY; Future  - TSH WITH REFLEX TO FT4; Future     Health maintenance: Up to date   Labs per orders  Immunizations per orders  Patient counseled about skin care, diet, supplements, and exercise.  Discussed  breast self exam, mammography screening, adequate intake of calcium and vitamin D, diet and exercise, colorectal cancer screening     Follow-up: Return in about 6  weeks (around 2/3/2022) for Depression, Anxiety, Follow up Medications, Follow up Labs.     Please note that this dictation was created using voice recognition software. I have worked with consultants from the vendor as well as technical experts from [facility] to optimize the interface. I have made every reasonable attempt to correct obvious errors, but I expect that there are errors of grammar and possibly content that I did not discover before finalizing the note.

## 2022-01-20 ENCOUNTER — HOSPITAL ENCOUNTER (OUTPATIENT)
Dept: LAB | Facility: MEDICAL CENTER | Age: 32
End: 2022-01-20
Attending: NURSE PRACTITIONER
Payer: COMMERCIAL

## 2022-01-20 DIAGNOSIS — E66.09 CLASS 1 OBESITY DUE TO EXCESS CALORIES WITH SERIOUS COMORBIDITY AND BODY MASS INDEX (BMI) OF 33.0 TO 33.9 IN ADULT: ICD-10-CM

## 2022-01-20 DIAGNOSIS — Z00.00 ROUTINE HEALTH MAINTENANCE: ICD-10-CM

## 2022-01-20 DIAGNOSIS — E78.5 DYSLIPIDEMIA: ICD-10-CM

## 2022-01-20 DIAGNOSIS — E55.9 VITAMIN D INSUFFICIENCY: ICD-10-CM

## 2022-01-20 DIAGNOSIS — F33.1 MODERATE EPISODE OF RECURRENT MAJOR DEPRESSIVE DISORDER (HCC): ICD-10-CM

## 2022-01-20 LAB
25(OH)D3 SERPL-MCNC: 34 NG/ML (ref 30–100)
ALBUMIN SERPL BCP-MCNC: 4.7 G/DL (ref 3.2–4.9)
ALBUMIN/GLOB SERPL: 1.7 G/DL
ALP SERPL-CCNC: 76 U/L (ref 30–99)
ALT SERPL-CCNC: 39 U/L (ref 2–50)
ANION GAP SERPL CALC-SCNC: 12 MMOL/L (ref 7–16)
AST SERPL-CCNC: 20 U/L (ref 12–45)
BASOPHILS # BLD AUTO: 0.9 % (ref 0–1.8)
BASOPHILS # BLD: 0.04 K/UL (ref 0–0.12)
BILIRUB SERPL-MCNC: 0.5 MG/DL (ref 0.1–1.5)
BUN SERPL-MCNC: 11 MG/DL (ref 8–22)
CALCIUM SERPL-MCNC: 9.5 MG/DL (ref 8.5–10.5)
CHLORIDE SERPL-SCNC: 105 MMOL/L (ref 96–112)
CHOLEST SERPL-MCNC: 199 MG/DL (ref 100–199)
CO2 SERPL-SCNC: 22 MMOL/L (ref 20–33)
CREAT SERPL-MCNC: 0.75 MG/DL (ref 0.5–1.4)
EOSINOPHIL # BLD AUTO: 0.08 K/UL (ref 0–0.51)
EOSINOPHIL NFR BLD: 1.8 % (ref 0–6.9)
ERYTHROCYTE [DISTWIDTH] IN BLOOD BY AUTOMATED COUNT: 43.4 FL (ref 35.9–50)
FASTING STATUS PATIENT QL REPORTED: NORMAL
GLOBULIN SER CALC-MCNC: 2.8 G/DL (ref 1.9–3.5)
GLUCOSE SERPL-MCNC: 106 MG/DL (ref 65–99)
HCT VFR BLD AUTO: 42.1 % (ref 37–47)
HDLC SERPL-MCNC: 43 MG/DL
HGB BLD-MCNC: 14.2 G/DL (ref 12–16)
IMM GRANULOCYTES # BLD AUTO: 0.01 K/UL (ref 0–0.11)
IMM GRANULOCYTES NFR BLD AUTO: 0.2 % (ref 0–0.9)
LDLC SERPL CALC-MCNC: 134 MG/DL
LYMPHOCYTES # BLD AUTO: 1.33 K/UL (ref 1–4.8)
LYMPHOCYTES NFR BLD: 30.2 % (ref 22–41)
MCH RBC QN AUTO: 30.9 PG (ref 27–33)
MCHC RBC AUTO-ENTMCNC: 33.7 G/DL (ref 33.6–35)
MCV RBC AUTO: 91.5 FL (ref 81.4–97.8)
MONOCYTES # BLD AUTO: 0.38 K/UL (ref 0–0.85)
MONOCYTES NFR BLD AUTO: 8.6 % (ref 0–13.4)
NEUTROPHILS # BLD AUTO: 2.56 K/UL (ref 2–7.15)
NEUTROPHILS NFR BLD: 58.3 % (ref 44–72)
NRBC # BLD AUTO: 0 K/UL
NRBC BLD-RTO: 0 /100 WBC
PLATELET # BLD AUTO: 286 K/UL (ref 164–446)
PMV BLD AUTO: 10.5 FL (ref 9–12.9)
POTASSIUM SERPL-SCNC: 4.5 MMOL/L (ref 3.6–5.5)
PROT SERPL-MCNC: 7.5 G/DL (ref 6–8.2)
RBC # BLD AUTO: 4.6 M/UL (ref 4.2–5.4)
SODIUM SERPL-SCNC: 139 MMOL/L (ref 135–145)
TRIGL SERPL-MCNC: 109 MG/DL (ref 0–149)
TSH SERPL DL<=0.005 MIU/L-ACNC: 2.34 UIU/ML (ref 0.38–5.33)
WBC # BLD AUTO: 4.4 K/UL (ref 4.8–10.8)

## 2022-01-20 PROCEDURE — 36415 COLL VENOUS BLD VENIPUNCTURE: CPT

## 2022-01-20 PROCEDURE — 80053 COMPREHEN METABOLIC PANEL: CPT

## 2022-01-20 PROCEDURE — 82306 VITAMIN D 25 HYDROXY: CPT

## 2022-01-20 PROCEDURE — 85025 COMPLETE CBC W/AUTO DIFF WBC: CPT

## 2022-01-20 PROCEDURE — 84443 ASSAY THYROID STIM HORMONE: CPT

## 2022-01-20 PROCEDURE — 80061 LIPID PANEL: CPT

## 2022-01-31 ENCOUNTER — APPOINTMENT (RX ONLY)
Dept: URBAN - METROPOLITAN AREA CLINIC 22 | Facility: CLINIC | Age: 32
Setting detail: DERMATOLOGY
End: 2022-01-31

## 2022-01-31 DIAGNOSIS — D22 MELANOCYTIC NEVI: ICD-10-CM

## 2022-01-31 DIAGNOSIS — Z71.89 OTHER SPECIFIED COUNSELING: ICD-10-CM

## 2022-01-31 DIAGNOSIS — L73.8 OTHER SPECIFIED FOLLICULAR DISORDERS: ICD-10-CM

## 2022-01-31 DIAGNOSIS — D18.0 HEMANGIOMA: ICD-10-CM

## 2022-01-31 DIAGNOSIS — L81.4 OTHER MELANIN HYPERPIGMENTATION: ICD-10-CM

## 2022-01-31 PROBLEM — D18.01 HEMANGIOMA OF SKIN AND SUBCUTANEOUS TISSUE: Status: ACTIVE | Noted: 2022-01-31

## 2022-01-31 PROBLEM — D22.5 MELANOCYTIC NEVI OF TRUNK: Status: ACTIVE | Noted: 2022-01-31

## 2022-01-31 PROCEDURE — 99203 OFFICE O/P NEW LOW 30 MIN: CPT

## 2022-01-31 PROCEDURE — ? COUNSELING

## 2022-01-31 PROCEDURE — ? PHOTO-DOCUMENTATION

## 2022-01-31 PROCEDURE — ? SUNSCREEN RECOMMENDATIONS

## 2022-01-31 ASSESSMENT — LOCATION ZONE DERM
LOCATION ZONE: TRUNK
LOCATION ZONE: LIP
LOCATION ZONE: ARM

## 2022-01-31 ASSESSMENT — LOCATION SIMPLE DESCRIPTION DERM
LOCATION SIMPLE: ABDOMEN
LOCATION SIMPLE: LEFT UPPER BACK
LOCATION SIMPLE: RIGHT LIP
LOCATION SIMPLE: LEFT FOREARM

## 2022-01-31 ASSESSMENT — LOCATION DETAILED DESCRIPTION DERM
LOCATION DETAILED: LEFT LATERAL ABDOMEN
LOCATION DETAILED: LEFT PROXIMAL DORSAL FOREARM
LOCATION DETAILED: LEFT INFERIOR UPPER BACK
LOCATION DETAILED: RIGHT INFERIOR VERMILION LIP
LOCATION DETAILED: RIGHT RIB CAGE

## 2022-02-03 ENCOUNTER — TELEMEDICINE (OUTPATIENT)
Dept: MEDICAL GROUP | Facility: PHYSICIAN GROUP | Age: 32
End: 2022-02-03
Payer: COMMERCIAL

## 2022-02-03 VITALS — HEIGHT: 63 IN | WEIGHT: 180 LBS | BODY MASS INDEX: 31.89 KG/M2 | RESPIRATION RATE: 14 BRPM

## 2022-02-03 DIAGNOSIS — E55.9 VITAMIN D INSUFFICIENCY: ICD-10-CM

## 2022-02-03 DIAGNOSIS — E66.09 CLASS 1 OBESITY DUE TO EXCESS CALORIES WITH SERIOUS COMORBIDITY AND BODY MASS INDEX (BMI) OF 33.0 TO 33.9 IN ADULT: ICD-10-CM

## 2022-02-03 DIAGNOSIS — F41.1 GAD (GENERALIZED ANXIETY DISORDER): ICD-10-CM

## 2022-02-03 DIAGNOSIS — E78.5 DYSLIPIDEMIA: ICD-10-CM

## 2022-02-03 DIAGNOSIS — F33.1 MODERATE EPISODE OF RECURRENT MAJOR DEPRESSIVE DISORDER (HCC): ICD-10-CM

## 2022-02-03 DIAGNOSIS — Z00.00 ROUTINE HEALTH MAINTENANCE: ICD-10-CM

## 2022-02-03 PROCEDURE — 99213 OFFICE O/P EST LOW 20 MIN: CPT | Mod: 95 | Performed by: NURSE PRACTITIONER

## 2022-02-03 RX ORDER — FLUOXETINE HYDROCHLORIDE 40 MG/1
40 CAPSULE ORAL
Qty: 90 CAPSULE | Refills: 3 | Status: SHIPPED | OUTPATIENT
Start: 2022-02-03

## 2022-02-03 RX ORDER — BUSPIRONE HYDROCHLORIDE 10 MG/1
10 TABLET ORAL 2 TIMES DAILY
Qty: 180 TABLET | Refills: 3 | Status: SHIPPED | OUTPATIENT
Start: 2022-02-03

## 2022-02-03 RX ORDER — BUPROPION HYDROCHLORIDE 150 MG/1
150 TABLET ORAL EVERY MORNING
Qty: 90 TABLET | Refills: 3 | Status: SHIPPED | OUTPATIENT
Start: 2022-02-03

## 2022-02-03 ASSESSMENT — PATIENT HEALTH QUESTIONNAIRE - PHQ9: CLINICAL INTERPRETATION OF PHQ2 SCORE: 0

## 2022-02-03 ASSESSMENT — FIBROSIS 4 INDEX: FIB4 SCORE: 0.36

## 2022-02-03 NOTE — PROGRESS NOTES
"Virtual Visit: Established Patient   This visit was conducted via Zoom using secure and encrypted videoconferencing technology.   The patient was in their home in the [facility].    The patient's identity was confirmed and verbal consent was obtained for this virtual visit.     Subjective:   CC:   Chief Complaint   Patient presents with   • Follow-Up     F/U on lab work and recent medication changes     Deysi Goodrich is a 31 y.o. female presenting for evaluation and management of:    Moderate episode of recurrent major depressive disorder (HCC)  Chronic, improving. Follows up today about 1 month after starting to take Wellbutrin 150 mg once daily. Continues to take buspirone 10 mg twice daily and fluoxetine 40 mg once daily. Reports feeling much better overall. Patient states that she does not feel nearly as agitated or quick to rise and is able to let things go easier. She also doesn't have feelings of \"let's never leave the house\" and is sleeping better. She does report that she continues to feel really tired. Reports that parents have noticed that she seems more engaged and more like herself. Per patient, appointment with behavioral health is scheduled for March 1, 2022.     ROS   See HPI.    Current medicines (including changes today)  Current Outpatient Medications   Medication Sig Dispense Refill   • buPROPion (WELLBUTRIN XL) 150 MG XL tablet Take 1 Tablet by mouth every morning. 90 Tablet 0   • fluoxetine (PROZAC) 40 MG capsule Take 1 Capsule by mouth every day. 90 Capsule 1   • busPIRone (BUSPAR) 10 MG Tab tablet Take 1 Tablet by mouth 2 times a day. 180 Tablet 1   • valACYclovir (VALTREX) 500 MG Tab Take 1 tablet by mouth every day. 90 tablet 3   • tizanidine (ZANAFLEX) 4 MG Tab Take 1 Tab by mouth every 12 hours as needed (muscle stiffness). 180 Tab 0   • Cyanocobalamin (VITAMIN B-12) 3000 MCG SL Tab Place  under tongue.     • Cholecalciferol (VITAMIN D-3) 5000 units Tab Take  by mouth.       No " "current facility-administered medications for this visit.     Patient Active Problem List    Diagnosis Date Noted   • Positive test for herpes simplex virus (HSV) antibody 07/02/2020   • FAUSTINO (generalized anxiety disorder) 06/08/2018   • Headache 03/29/2018   • PTSD (post-traumatic stress disorder) 02/03/2018   • Class 1 obesity due to excess calories with serious comorbidity and body mass index (BMI) of 33.0 to 33.9 in adult 02/01/2018   • Vitamin D insufficiency 02/01/2018   • Dyslipidemia 02/01/2018   • Moderate episode of recurrent major depressive disorder (HCC) 02/01/2018   • Panic attacks 07/01/2013   • Mild dysplasia of cervix (KAREL I) 03/16/2010      Objective:   Resp 14   Ht 1.6 m (5' 3\")   Wt 81.6 kg (180 lb)   BMI 31.89 kg/m²     Physical Exam:  Constitutional: Alert, no distress, well-groomed.  Skin: No rashes in visible areas.  Eye: Round. Conjunctiva clear, lids normal. No icterus.   ENMT: Lips pink without lesions, good dentition, moist mucous membranes. Phonation normal.  Neck: No masses, no thyromegaly. Moves freely without pain.  Respiratory: Unlabored respiratory effort, no cough or audible wheeze  Psych: Alert and oriented x3, normal affect and mood.     Assessment and Plan:   The following treatment plan was discussed:   1. Moderate episode of recurrent major depressive disorder (HCC)  Chronic, improving. Continue to take Wellbutrin 150 mg once daily, buspirone 10 mg twice daily, fluoxetine 40 once daily. Refills sent to pharmacy. Follow with behavioral health as scheduled and per their recommendation. Due for updated labs in January 2023.  - TSH WITH REFLEX TO FT4; Future  - VITAMIN D,25 HYDROXY; Future    2. Class 1 obesity due to excess calories with serious comorbidity and body mass index (BMI) of 33.0 to 33.9 in adult  Due for updated labs in January 2023.  - CBC WITH DIFFERENTIAL; Future  - Comp Metabolic Panel; Future  - Lipid Profile; Future  - TSH WITH REFLEX TO FT4; Future    3. " Dyslipidemia  Due for updated labs in January 2023.  - Comp Metabolic Panel; Future  - Lipid Profile; Future    4. Vitamin D insufficiency  Due for updated labs in January 2023.  - VITAMIN D,25 HYDROXY; Future    5. Routine health maintenance  Due for updated labs in January 2023.  - CBC WITH DIFFERENTIAL; Future  - Comp Metabolic Panel; Future  - Lipid Profile; Future  - TSH WITH REFLEX TO FT4; Future  - VITAMIN D,25 HYDROXY; Future     Follow-up: Return in about 1 year (around 2/3/2023) for Preventative Annual, Follow up Labs.

## 2022-02-03 NOTE — ASSESSMENT & PLAN NOTE
"Chronic, improving. Follows up today about 1 month after starting to take Wellbutrin 150 mg once daily. Continues to take buspirone 10 mg twice daily and fluoxetine 40 mg once daily. Reports feeling much better overall. Patient states that she does not feel nearly as agitated or quick to rise and is able to let things go easier. She also doesn't have feelings of \"let's never leave the house\" and is sleeping better. She does report that she continues to feel really tired. Reports that parents have noticed that she seems more engaged and more like herself. Per patient, appointment with behavioral health is scheduled for March 1, 2022.  "

## 2022-03-01 ENCOUNTER — TELEMEDICINE (OUTPATIENT)
Dept: BEHAVIORAL HEALTH | Facility: CLINIC | Age: 32
End: 2022-03-01
Payer: COMMERCIAL

## 2022-03-01 DIAGNOSIS — F41.1 GENERALIZED ANXIETY DISORDER: ICD-10-CM

## 2022-03-01 DIAGNOSIS — F33.1 MODERATE EPISODE OF RECURRENT MAJOR DEPRESSIVE DISORDER (HCC): ICD-10-CM

## 2022-03-01 DIAGNOSIS — F43.10 POSTTRAUMATIC STRESS DISORDER: ICD-10-CM

## 2022-03-01 PROCEDURE — 90791 PSYCH DIAGNOSTIC EVALUATION: CPT | Performed by: MARRIAGE & FAMILY THERAPIST
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
## 2022-03-14 NOTE — PROCEDURE: COUNSELING
03/14/22      Boris Cristina  [address] [place] [id number]       Laura INR today was 2.1  Warfarin dosing: continue taking 1.5mg Sunday, Tuesday, Thursday and 2mg the other 4 days of the week; recheck INR in 1 week on Monday 3/21/22.   Warfarin was escribed to [facility].     Sincerely,     Licha Ennis RN  [facility], [address] [place] [address]  Phone: [phone]  Fax: [phone]              
Detail Level: Generalized
Detail Level: Zone
Detail Level: Detailed

## 2022-07-28 ENCOUNTER — OFFICE VISIT (OUTPATIENT)
Dept: URGENT CARE | Facility: PHYSICIAN GROUP | Age: 32
End: 2022-07-28
Payer: COMMERCIAL

## 2022-07-28 VITALS
HEIGHT: 62 IN | DIASTOLIC BLOOD PRESSURE: 70 MMHG | WEIGHT: 180 LBS | TEMPERATURE: 98.8 F | OXYGEN SATURATION: 98 % | BODY MASS INDEX: 33.13 KG/M2 | SYSTOLIC BLOOD PRESSURE: 116 MMHG | HEART RATE: 100 BPM | RESPIRATION RATE: 18 BRPM

## 2022-07-28 DIAGNOSIS — U07.1 COVID-19: ICD-10-CM

## 2022-07-28 LAB
EXTERNAL QUALITY CONTROL: ABNORMAL
SARS-COV+SARS-COV-2 AG RESP QL IA.RAPID: POSITIVE

## 2022-07-28 PROCEDURE — 99213 OFFICE O/P EST LOW 20 MIN: CPT | Mod: CS

## 2022-07-28 PROCEDURE — 87426 SARSCOV CORONAVIRUS AG IA: CPT

## 2022-07-28 ASSESSMENT — ENCOUNTER SYMPTOMS
FEVER: 1
NAUSEA: 0
MYALGIAS: 1
SORE THROAT: 1
HEADACHES: 1
DIARRHEA: 0
CHILLS: 1
VOMITING: 0
DIZZINESS: 0
SPUTUM PRODUCTION: 1
COUGH: 1

## 2022-07-28 ASSESSMENT — FIBROSIS 4 INDEX: FIB4 SCORE: 0.36

## 2022-07-28 NOTE — PROGRESS NOTES
Subjective     Deysi Goodrich is a 31 y.o. female who presents with Coronavirus Screening (Per patient needs a covid test done for her work. Per patient did 2 at home and it was positive. Per patient has sore throat, fever, body pain,cough. -)          HPI    Patient presents with symptoms that started 2 days prior.  She reports fever and chills, with fever undocumented, states she felt warm to touch.  This is also accompanied by fatigue, nasal congestion, rhinorrhea, sore throat, productive cough, and myalgias.  She denies any nausea, vomiting, diarrhea.  Patient reports daily tested for COVID at home today.  Patient reports she is COVID vaccinated.  She is here requesting for COVID testing, per her work requirements.      Patient's current problem list, medications, and past medical/surgical history were reviewed in Epic.      PMH:  has a past medical history of Anxiety, Bleeding nose (chronic), Depression, Migraine ([id number]), Mild dysplasia of cervix (KAREL I) (3/16/2010), Palpitations (6/5/2012), PTSD (post-traumatic stress disorder), and Syncope (6/5/2012).  MEDS:   Current Outpatient Medications:   •  buPROPion (WELLBUTRIN XL) 150 MG XL tablet, Take 1 Tablet by mouth every morning., Disp: 90 Tablet, Rfl: 3  •  busPIRone (BUSPAR) 10 MG Tab tablet, Take 1 Tablet by mouth 2 times a day., Disp: 180 Tablet, Rfl: 3  •  fluoxetine (PROZAC) 40 MG capsule, Take 1 Capsule by mouth every day., Disp: 90 Capsule, Rfl: 3  •  valACYclovir (VALTREX) 500 MG Tab, Take 1 tablet by mouth every day., Disp: 90 tablet, Rfl: 3  •  tizanidine (ZANAFLEX) 4 MG Tab, Take 1 Tab by mouth every 12 hours as needed (muscle stiffness)., Disp: 180 Tab, Rfl: 0  •  Cyanocobalamin (VITAMIN B-12) 3000 MCG SL Tab, Place  under tongue., Disp: , Rfl:   •  Cholecalciferol (VITAMIN D-3) 5000 units Tab, Take  by mouth., Disp: , Rfl:   ALLERGIES: No Known Allergies  SURGHX:   Past Surgical History:   Procedure Laterality Date   • DENTAL EXTRACTION(S)   "2006    one wisdom tooth     SOCHX:  reports that she quit smoking about 12 years ago. Her smoking use included cigarettes. She has a 3.00 pack-year smoking history. She has never used smokeless tobacco. She reports current alcohol use of about 1.2 oz of alcohol per week. She reports that she does not use drugs.  FH: Reviewed with patient, not pertinent to this visit.     Review of Systems   Constitutional: Positive for chills, fever (undocumented) and malaise/fatigue.   HENT: Positive for congestion and sore throat.    Respiratory: Positive for cough and sputum production.    Gastrointestinal: Negative for diarrhea, nausea and vomiting.   Musculoskeletal: Positive for myalgias.   Neurological: Positive for headaches. Negative for dizziness.              Objective     /70   Pulse 100   Temp 37.1 °C (98.8 °F) (Temporal)   Resp 18   Ht 1.575 m (5' 2\")   Wt 81.6 kg (180 lb)   SpO2 98%   BMI 32.92 kg/m²      Physical Exam  Constitutional:       Appearance: Normal appearance.   HENT:      Head: Normocephalic.      Nose: Congestion present. No rhinorrhea.      Mouth/Throat:      Pharynx: Posterior oropharyngeal erythema present.   Eyes:      Extraocular Movements: Extraocular movements intact.   Cardiovascular:      Rate and Rhythm: Normal rate and regular rhythm.      Pulses: Normal pulses.      Heart sounds: Normal heart sounds.   Pulmonary:      Effort: Pulmonary effort is normal.      Breath sounds: Normal breath sounds.   Musculoskeletal:         General: Normal range of motion.      Cervical back: Normal range of motion.   Skin:     General: Skin is warm and dry.   Neurological:      General: No focal deficit present.      Mental Status: She is alert.   Psychiatric:         Mood and Affect: Mood normal.         Behavior: Behavior normal.         Judgment: Judgment normal.                 Assessment & Plan        1. COVID-19    - POCT SARS-COV Antigen YVETTE (Symptomatic only)       Patient tested positive " for COVID-19 here in the clinic.  She is instructed to quarantine per CDC guidelines.  Discussed treatment plan with patient, she is agreeable and verbalized understanding.  Educated patient on signs and symptoms watch out for, when to return to the clinic or go to the ER.    Recommended supportive treatment at home:  OTC Tylenol or Motrin for fever/discomfort.  OTC supportive care for nasal congestion - saline nasal spray/Flonase nasal spray and/or netipot  Humidifier and steam inhalation/warm showers.  Warm saline gargles for sore throat.  Continue Mucinex or other OTC cough preparation as needed  Increase oral fluid intake.    Electronically Signed by MITRA Loomis

## 2022-11-07 ENCOUNTER — HOSPITAL ENCOUNTER (OUTPATIENT)
Facility: MEDICAL CENTER | Age: 32
End: 2022-11-07
Attending: SPECIALIST
Payer: COMMERCIAL

## 2022-11-07 PROCEDURE — 88175 CYTOPATH C/V AUTO FLUID REDO: CPT

## 2022-11-07 PROCEDURE — 87591 N.GONORRHOEAE DNA AMP PROB: CPT

## 2022-11-07 PROCEDURE — 87491 CHLMYD TRACH DNA AMP PROBE: CPT

## 2022-11-07 PROCEDURE — 87624 HPV HI-RISK TYP POOLED RSLT: CPT

## 2022-12-07 ENCOUNTER — TELEMEDICINE (OUTPATIENT)
Dept: MEDICAL GROUP | Facility: PHYSICIAN GROUP | Age: 32
End: 2022-12-07
Payer: COMMERCIAL

## 2022-12-07 VITALS — WEIGHT: 185 LBS | BODY MASS INDEX: 32.78 KG/M2 | RESPIRATION RATE: 16 BRPM | HEIGHT: 63 IN

## 2022-12-07 DIAGNOSIS — R06.83 SNORING: ICD-10-CM

## 2022-12-07 DIAGNOSIS — R51.9 FREQUENT HEADACHES: ICD-10-CM

## 2022-12-07 DIAGNOSIS — G47.9 SLEEP DIFFICULTIES: ICD-10-CM

## 2022-12-07 PROCEDURE — 99213 OFFICE O/P EST LOW 20 MIN: CPT | Mod: 95 | Performed by: NURSE PRACTITIONER

## 2022-12-07 ASSESSMENT — PATIENT HEALTH QUESTIONNAIRE - PHQ9
4. FEELING TIRED OR HAVING LITTLE ENERGY: NOT AT ALL
SUM OF ALL RESPONSES TO PHQ QUESTIONS 1-9: 0
8. MOVING OR SPEAKING SO SLOWLY THAT OTHER PEOPLE COULD HAVE NOTICED. OR THE OPPOSITE, BEING SO FIGETY OR RESTLESS THAT YOU HAVE BEEN MOVING AROUND A LOT MORE THAN USUAL: NOT AT ALL
5. POOR APPETITE OR OVEREATING: NOT AT ALL
3. TROUBLE FALLING OR STAYING ASLEEP OR SLEEPING TOO MUCH: NOT AT ALL
7. TROUBLE CONCENTRATING ON THINGS, SUCH AS READING THE NEWSPAPER OR WATCHING TELEVISION: NOT AT ALL
2. FEELING DOWN, DEPRESSED, IRRITABLE, OR HOPELESS: NOT AT ALL
1. LITTLE INTEREST OR PLEASURE IN DOING THINGS: NOT AT ALL
9. THOUGHTS THAT YOU WOULD BE BETTER OFF DEAD, OR OF HURTING YOURSELF: NOT AT ALL
SUM OF ALL RESPONSES TO PHQ9 QUESTIONS 1 AND 2: 0
6. FEELING BAD ABOUT YOURSELF - OR THAT YOU ARE A FAILURE OR HAVE LET YOURSELF OR YOUR FAMILY DOWN: NOT AL ALL

## 2022-12-07 ASSESSMENT — FIBROSIS 4 INDEX: FIB4 SCORE: 0.37

## 2022-12-07 NOTE — ASSESSMENT & PLAN NOTE
9-11 hours a night  Still tired all of the time  Last, shared a hotel room   Normal snores sounded like dirtbiking  Then gasping and paper ripping  He freaked out and would wake her up  She lives alone    Headaches (thought neck pain) went back to yoga   Tired all the time  Doesn't monitor  Weight gain

## 2022-12-11 PROBLEM — G47.9 SLEEP DIFFICULTIES: Status: ACTIVE | Noted: 2022-12-11

## 2022-12-11 NOTE — PROGRESS NOTES
Virtual Visit: Established Patient   This visit was conducted via Zoom using secure and encrypted videoconferencing technology.   The patient was in a private location in the state of [place].    The patient's identity was confirmed and verbal consent was obtained for this virtual visit.     Subjective:   CC:   Chief Complaint   Patient presents with    Sleep Problem     X gasping for air at night     Deysi Goodrich is a 32 y.o. female presenting for evaluation and management of:    Sleep difficulties  New to examiner.  Patient reports that she sleeps on average 9-11 hours a night but still feels tired in the majority of time.  Recently traveled to [place] and shared a hotel room, she was told that she snores so loudly that it sound like a dirt bike and then would frequently gasp and make sounds like paper ripping.  The patient lives alone, so she has not been told this before.  She has been experiencing headaches that she initially thought was due to neck pain, so she went back to yoga.  She does not monitor her blood pressure, but does note weight gain.  Requested referral to sleep medicine for sleep study.     ROS   See HPI    Current medicines (including changes today)  Current Outpatient Medications   Medication Sig Dispense Refill    buPROPion (WELLBUTRIN XL) 150 MG XL tablet Take 1 Tablet by mouth every morning. 90 Tablet 3    busPIRone (BUSPAR) 10 MG Tab tablet Take 1 Tablet by mouth 2 times a day. 180 Tablet 3    fluoxetine (PROZAC) 40 MG capsule Take 1 Capsule by mouth every day. 90 Capsule 3    tizanidine (ZANAFLEX) 4 MG Tab Take 1 Tab by mouth every 12 hours as needed (muscle stiffness). 180 Tab 0    Cyanocobalamin (VITAMIN B-12) 3000 MCG SL Tab Place  under tongue.      Cholecalciferol (VITAMIN D-3) 5000 units Tab Take  by mouth.       No current facility-administered medications for this visit.     Patient Active Problem List    Diagnosis Date Noted    Sleep difficulties 12/11/2022    Positive test for  "herpes simplex virus (HSV) antibody 07/02/2020    FAUSTINO (generalized anxiety disorder) 06/08/2018    Headache 03/29/2018    PTSD (post-traumatic stress disorder) 02/03/2018    Class 1 obesity due to excess calories with serious comorbidity and body mass index (BMI) of 33.0 to 33.9 in adult 02/01/2018    Vitamin D insufficiency 02/01/2018    Dyslipidemia 02/01/2018    Moderate episode of recurrent major depressive disorder (HCC) 02/01/2018    Panic attacks 07/01/2013    Mild dysplasia of cervix (KAREL I) 03/16/2010      Objective:   Resp 16   Ht 1.6 m (5' 3\")   Wt 83.9 kg (185 lb)   LMP 12/07/2022 (Exact Date)   BMI 32.77 kg/m²     Physical Exam:  Constitutional: Alert, no distress, well-groomed.  Skin: No rashes in visible areas.  Eye: Round. Conjunctiva clear, lids normal. No icterus.   ENMT: Lips pink without lesions, good dentition, moist mucous membranes. Phonation normal.  Neck: No masses, no thyromegaly. Moves freely without pain.  Respiratory: Unlabored respiratory effort, no cough or audible wheeze  Psych: Alert and oriented x3, normal affect and mood.     Assessment and Plan:   The following treatment plan was discussed:   1. Sleep difficulties  2. Snoring  3. Frequent headaches  Problem to examiner.  Patient requesting referral to medicine for evaluation of sleep apnea.  Reports weight gain, headaches, persistent fatigue despite 9-11 hours of sleep at night.  Recently told that she gasps and snores loudly while sleeping.  Referral placed.  - Referral to Pulmonary and Sleep Medicine     Follow-up: Return if symptoms worsen or fail to improve.       Please note that this dictation was created using voice recognition software. I have worked with consultants from the vendor as well as technical experts from [employer] to optimize the interface. I have made every reasonable attempt to correct obvious errors, but I expect that there are errors of grammar and possibly content that I did not discover before " finalizing the note.

## 2022-12-16 ENCOUNTER — TELEPHONE (OUTPATIENT)
Dept: HEALTH INFORMATION MANAGEMENT | Facility: OTHER | Age: 32
End: 2022-12-16
Payer: COMMERCIAL

## 2023-03-06 NOTE — PROGRESS NOTES
Telemedicine Visit: New Patient     This encounter was conducted via Zoom using secure and encrypted video conferencing technology.   The patient was in a private location  (POS 10) in the state of [place]. The patient's identity was confirmed and verbal consent was obtained for this virtual visit.    Subjective:     CC: Evaluation possible sleep apnea    HPI:  Deysi Goodrich is a 32 y.o. female [employer] employee kindly referred by [facility] who elicited a history of snoring, frequent headaches, and observed sleep apnea. Exhausted by the end of her work day.    Significant comorbidities modifying factors include former smoker, PTSD, panic attacks, depression, mild cervical dysplasia, dyslipidemia, and obesity.      Symptom Summary:  Snoring: +  Very loud snoring: +  Witnessed apneas: +  Resuscitative snorts: +  Nocturnal shortness of breath: -  Non-restorative sleep: +  Insomnia: denies  Nocturnal awakenings: +  Nocturia: -  EDS: +  Fatigue: +  Tiredness: +  Falls asleep accidentally: -  Napping or returning to bed after arising: + not so much now  Restless legs: -  Limb movements during sleep: not specifically  Nocturnal headaches: sometimes    ROS  See HPI  Constitutional: Negative for fever, chills and malaise/fatigue.   HENT: Negative for congestion.    Eyes: Negative for pain.   Respiratory: current bronchitis  Cardiovascular: Negative for leg swelling.   Gastrointestinal: Negative for nausea, vomiting, abdominal pain and diarrhea.   Genitourinary: Negative for dysuria and hematuria.   Skin: Negative for rash.   Neurological: Negative for dizziness, focal weakness and headaches.   Endo/Heme/Allergies: Does not bruise/bleed easily.   Psychiatric/Behavioral: Negative for depression.  The patient is not nervous/anxious.      No Known Allergies    Current medicines (including changes today)  Current Outpatient Medications   Medication Sig Dispense Refill    zolpidem (AMBIEN) 5 MG Tab Take 1-2  Tablets by mouth at bedtime as needed for Sleep (Take 1-2 tabs on the night of the sleep study as needed) for up to 1 day. Take 1-2 tabs on the night of the sleep study as needed 2 Tablet 0    buPROPion (WELLBUTRIN XL) 150 MG XL tablet Take 1 Tablet by mouth every morning. 90 Tablet 3    busPIRone (BUSPAR) 10 MG Tab tablet Take 1 Tablet by mouth 2 times a day. 180 Tablet 3    fluoxetine (PROZAC) 40 MG capsule Take 1 Capsule by mouth every day. 90 Capsule 3    tizanidine (ZANAFLEX) 4 MG Tab Take 1 Tab by mouth every 12 hours as needed (muscle stiffness). 180 Tab 0    Cyanocobalamin (VITAMIN B-12) 3000 MCG SL Tab Place  under tongue.      Cholecalciferol (VITAMIN D-3) 5000 units Tab Take  by mouth.       No current facility-administered medications for this visit.       She  has a past medical history of Anxiety, Bleeding nose (chronic), Chickenpox, Depression, Migraine ([id number]), Mild dysplasia of cervix (KAREL I) (03/16/2010), Palpitations (06/05/2012), PTSD (post-traumatic stress disorder), and Syncope (06/05/2012).  She  has a past surgical history that includes dental extraction(s) (2006).      Family History   Problem Relation Age of Onset    Cancer Mother         Cervical    Depression Mother     Anxiety disorder Mother     Alcohol/Drug Mother     Hypertension Father     Anxiety disorder Father     Heart Disease Father     Alcohol abuse Father     Hyperlipidemia Father     No Known Problems Sister     Alcohol/Drug Maternal Uncle     Anxiety disorder Paternal Aunt     Depression Paternal Aunt     Colon Cancer Paternal Aunt 58    Alcohol abuse Paternal Uncle     Depression Maternal Grandmother     Anxiety disorder Maternal Grandmother     Heart Attack Maternal Grandfather     Alcohol abuse Maternal Grandfather     Depression Paternal Grandmother     Anxiety disorder Paternal Grandmother     Alcohol abuse Paternal Grandmother     Diabetes Paternal Grandfather     Hypertension Paternal Grandfather     Alcohol abuse  Paternal Grandfather     Anxiety disorder Paternal Aunt     Depression Paternal Aunt     Alcohol abuse Paternal Aunt     Anxiety disorder Paternal Aunt     Depression Paternal Aunt     Alcohol/Drug Maternal Uncle     Stroke Neg Hx      Family Status   Relation Name Status    Mo  Alive    Fa  Alive    Sis   at age 19        car accident     MUnc  Alive    PAunt  Alive    PUnc  (Not Specified)    MGMo  Alive    MGFa          MI    PGMo  Alive    PGFa      PAunt  Alive    PAunt  Alive    MUnc  Alive    Neg Hx  (Not Specified)       Patient Active Problem List    Diagnosis Date Noted    Obstructive sleep apnea 2023    Snoring 2023    Sleep difficulties 2022    Positive test for herpes simplex virus (HSV) antibody 2020    FAUSTINO (generalized anxiety disorder) 2018    Headache 2018    PTSD (post-traumatic stress disorder) 2018    Class 1 obesity due to excess calories with serious comorbidity and body mass index (BMI) of 33.0 to 33.9 in adult 2018    Vitamin D insufficiency 2018    Dyslipidemia 2018    Moderate episode of recurrent major depressive disorder (HCC) 2018    Panic attacks 2013    Mild dysplasia of cervix (KAREL I) 2010          Objective:   Vitals obtained by patient:  Weight 190 pounds  Height 5 feet 3 inches  Respirations observed to be 14 breaths/min        Physical Exam: Obese  Constitutional: Alert, no distress, well-groomed.  Skin: No rashes in visible areas.  Eye: Round. Conjunctiva clear, lids normal. No icterus.   ENMT: Lips pink without lesions, good dentition, moist mucous membranes. Phonation normal.  Neck: No masses, no thyromegaly. Moves freely without pain.  CV: Pulse as reported by patient  Respiratory: Unlabored respiratory effort, no cough or audible wheeze  Psych: Alert and oriented x3, normal affect and mood.       Medical Decision Making           The medical record was reviewed in its  entirety including the referral notes, records from primary care, consultants notes, hospital records, labs, imaging, microbiology, immunology, and immunizations.  Care gaps identified and reviewed with the patient.    Diagnostic and titration nocturnal polysomnogram's, home sleep apnea tests, continuous nocturnal oximetry results, multiple sleep latency tests, and compliance reports reviewed.        The patient has signs and symptoms consistent with obstructive sleep apnea hypopnea syndrome. Will schedule to have a nocturnal polysomnogram using zolpidem to assist with sleep onset and maintenance should the need arise. Will return after the results are available to determine further diagnostic needs and/or treatment options.    The risks of untreated sleep apnea were discussed with the patient at length. Patients with NADIA are at increased risk of cardiovascular disease including coronary artery disease, systemic arterial hypertension, pulmonary arterial hypertension, cardiac arrythmias, and stroke. NADIA patients have an increased risk of motor vehicle accidents, type 2 diabetes, chronic kidney disease, and non-alcoholic liver disease. The patient was advised to avoid driving a motor vehicle when drowsy.    Positive airway pressure, such as CPAP, is considered first-line and preferred therapy for sleep apnea and may reverse both symptoms and risks.    Have advised the patient to follow up with the appropriate healthcare practitioners for all other medical problems and issues.      The following treatment plan was discussed:     1. Obstructive sleep apnea  - zolpidem (AMBIEN) 5 MG Tab; Take 1-2 Tablets by mouth at bedtime as needed for Sleep (Take 1-2 tabs on the night of the sleep study as needed) for up to 1 day. Take 1-2 tabs on the night of the sleep study as needed  Dispense: 2 Tablet; Refill: 0  - Polysomnography, 4 or More; Future    2. Snoring    3. Sleep difficulties      Time spent 45 minutes. More than 1/2  the time spent coordinating care, counseling the patient, and reviewing the pathophysiology and treatment options for sleep apnea and the patient specific modifying factors and significant co-morbidities.        Follow-up: RTC after Sleep Study

## 2023-03-13 ENCOUNTER — TELEPHONE (OUTPATIENT)
Dept: SLEEP MEDICINE | Facility: MEDICAL CENTER | Age: 33
End: 2023-03-13

## 2023-03-13 ENCOUNTER — TELEMEDICINE (OUTPATIENT)
Dept: SLEEP MEDICINE | Facility: MEDICAL CENTER | Age: 33
End: 2023-03-13
Attending: NURSE PRACTITIONER
Payer: COMMERCIAL

## 2023-03-13 VITALS — HEIGHT: 63 IN | WEIGHT: 190 LBS | BODY MASS INDEX: 33.66 KG/M2

## 2023-03-13 DIAGNOSIS — G47.33 OBSTRUCTIVE SLEEP APNEA: ICD-10-CM

## 2023-03-13 DIAGNOSIS — G47.9 SLEEP DIFFICULTIES: ICD-10-CM

## 2023-03-13 DIAGNOSIS — R06.83 SNORING: ICD-10-CM

## 2023-03-13 PROCEDURE — 99204 OFFICE O/P NEW MOD 45 MIN: CPT | Mod: 95 | Performed by: INTERNAL MEDICINE

## 2023-03-13 RX ORDER — ZOLPIDEM TARTRATE 5 MG/1
5-10 TABLET ORAL NIGHTLY PRN
Qty: 2 TABLET | Refills: 0 | Status: SHIPPED | OUTPATIENT
Start: 2023-03-13 | End: 2023-03-14

## 2023-03-13 ASSESSMENT — FIBROSIS 4 INDEX: FIB4 SCORE: 0.37

## 2023-03-13 NOTE — TELEPHONE ENCOUNTER
LVM for the pt wanting to schedule a in lab SS and Fv apt for the results. I advise the pt to give us a call back to schedule the apt's

## 2023-03-31 ENCOUNTER — SLEEP STUDY (OUTPATIENT)
Dept: SLEEP MEDICINE | Facility: MEDICAL CENTER | Age: 33
End: 2023-03-31
Attending: INTERNAL MEDICINE
Payer: COMMERCIAL

## 2023-03-31 DIAGNOSIS — G47.33 OBSTRUCTIVE SLEEP APNEA: ICD-10-CM

## 2023-03-31 PROCEDURE — 95811 POLYSOM 6/>YRS CPAP 4/> PARM: CPT | Performed by: INTERNAL MEDICINE

## 2023-04-03 NOTE — PROCEDURES
MONTAGE: Standard  STUDY TYPE: Split Night  RECORDING TECHNIQUE:   After the scalp was prepared, gold plated electrodes were applied to the scalp according to the International 10-20 System. EEG (electroencephalogram) was continuously monitored from the O1-M2, O2-M1, C3-M2, C4-M1, F3-M2, and F4-M1. EOGs (electrooculograms) were monitored by electrodes placed at the left and right outer canthi. Chin EMG (electromyogram) was monitored by electrodes placed on the mentalis and sub-mentalis muscles. Nasal and oral airflow were monitored using a triple port thermocouple as well as oronasal pressure transducer. Respiratory effort was measured by inductive plethysmography technology employing abdominal and thoracic belts. Blood oxygen saturation and pulse were monitored by pulse oximetry. Heart rhythm was monitored by surface electrocardiogram. Leg EMG was studied using surface electrodes placed on left and right anterior tibialis. A microphone was used to monitor tracheal sounds and snoring. Body position was monitored and documented by technician observation.   SCORING CRITERIA:   A modification of the AASM manual for scoring of sleep and associated events was used. Obstructive apneas were scored by cessation of airflow for at least 10 seconds with continuing respiratory effort. Central apneas were scored by cessation of airflow for at least 10 seconds with no respiratory effort. Hypopneas were scored by a 30% or more reduction in airflow for at least 10 seconds accompanied by arterial oxygen desaturation of 3% or an arousal. For CMS (Medicare) patients, per AASM rule 1B, hypopneas are scored by 30% with mild reduction in airflow for at least 10 seconds accompanied by arterial saturation decreased at 4%.  DIAGNOSTIC  Study start time was 08:28:55 PM. Diagnostic recording time was 208 minutes with a total sleep time of 165 minutes resulting in a sleep efficiency of 79.33%%. Sleep latency from the start of the study was 31  minutes and the latency from sleep to REM was 107 minutes. In total,24 arousals were scored for an arousal index of 8.7.  Respiratory:  There were a total of 5 apneas consisting of 2 obstructive apneas, 0 mixed apneas, and 3 central apneas. A total of 59 hypopneas were scored. The apnea index was 1.82 per hour and the hypopnea index was 21.45 per hour resulting in an overall AHI of 23.27. AHI during rem was 75.2 and AHI while supine was 27.69.  Oximetry:  There was a mean oxygen saturation of 93.0%. The minimum oxygen saturation during NREM sleep was89.0% and in REM was 86.0. Time spent during sleep with oxygen saturations <88% was 1.2 minutes.   Cardiac:  The highest heart rate seen while awake was 105 BPM while the highest heart rate during sleep was 105 BPM with an average sleeping heart rate of 77 BPM.  Limb Movements:  There were a total of 14 PLMs during sleep, which resulted in a PLM index of 5.1. There were 6 PLMs associated with arousals which resulted in a PLMS arousal index of 2.2.  TREATMENT:  Treatment recording time was 5h 16.0m (316 minutes) with a total sleep time of 4h 53.0m (293 minutes) resulting in a sleep efficiency of 92.7%. Sleep latency from the start of treatment was 08 minutes and REM latency from sleep onset was 0h 51.5m. The patient had 23 arousals in total for an arousal index of 4.7.  Respiratory:   There were 11 apneas in total consisting of 5 obstructive apneas, 6 central apneas, and 0 mixed apneas for an apnea index of 2.25. The patient had 20 hypopneas in total, which resulted in a hypopnea index of 4.10. The overall AHI was 6.35, with a REM AHI of 9.44, and a supine AHI of 8.82.   Oximetry:  The mean SaO2 during treatment was 94.0%. The minimum oxygen saturation in NREM was85.0 % and in REM was 87.0%. Patient spent 0.8 minutes of TST with SaO2 <88%.  Cardiac:  The highest heart rate during sleep was 95 BPM with an average sleeping heart rate of 76BPM.  Limb Movements:  There were a  total of 4 PLMS during titration sleep time that resulted in an index of 0.8. There were 3 PLMS associated with arousals. This resulted in a PLM arousal index of 0.6.  Titration: CPAP was tried from 5-12cm H2O. Bipap was tried from 14/10-17/13.  This was a fully attended sleep study. This test was technically adequate.  The patient used a small medium Niko fullface mask.        ASSESSMENT:    Moderate obstructive sleep apnea hypopnea - overall AHI 23.3, supine AHI 27.7, mean event duration 23.4 seconds, longest event duration 43.8 seconds  Minimal nocturnal desaturation - tyree saturation 86% - saturations less than or equal to 88% for 0.7% of the TST  Successful BiPAP titration.  The apnea hypopnea index normalized on both 17/12 and 17/13 CWP with the achievement of supine REM sleep.    The AHI is the number of apneas (cessation of airflow for 10 seconds or longer) and hypopneas (shallow breaths accompanied by at least a 3% drop in the oxygen saturation) that occur per hour. Less than 5 per hour is normal, 5-15 per hour is mild, 15-30 per hour moderate, and greater than 30 per hour severe.        RECOMMENDATION:    Recommend BiPAP 17/13 cmH2O using a small medium Niko fullface mask or mask of choice to fit and heated humidification followed by data card and clinical review 31-90 days after receiving equipment.      Patients with sleep apnea are at increased risk of cardiovascular disease including systemic arterial hypertension, pulmonary arterial hypertension, (transient or fixed), transient ischemic attacks, and an elevated risk of stroke, heart attack, sudden death, or arrhythmia between the hours of 11 PM and 6 AM.  Sleep apnea patients have an increased risk of motor vehicle accidents, type 2 diabetes, gastroesophageal reflux, repetitive mechanical trauma to pharyngeal muscles with inflammation and denervation, frequent EEG arousals leading to nonrestorative sleep, excessive daytime somnolence, fatigue,  depression, poor pain control, irritability, and a lower quality of life.                Dr. Kumar Solares MD

## 2023-04-06 ENCOUNTER — PATIENT MESSAGE (OUTPATIENT)
Dept: SLEEP MEDICINE | Facility: MEDICAL CENTER | Age: 33
End: 2023-04-06
Payer: COMMERCIAL

## 2023-04-06 DIAGNOSIS — G47.33 OBSTRUCTIVE SLEEP APNEA: ICD-10-CM

## 2023-04-07 ENCOUNTER — PATIENT MESSAGE (OUTPATIENT)
Dept: SLEEP MEDICINE | Facility: MEDICAL CENTER | Age: 33
End: 2023-04-07
Payer: COMMERCIAL

## 2023-04-07 DIAGNOSIS — G47.33 OBSTRUCTIVE SLEEP APNEA: ICD-10-CM

## 2023-04-13 ENCOUNTER — OFFICE VISIT (OUTPATIENT)
Dept: MEDICAL GROUP | Facility: PHYSICIAN GROUP | Age: 33
End: 2023-04-13
Payer: COMMERCIAL

## 2023-04-13 VITALS
HEIGHT: 62 IN | WEIGHT: 202 LBS | SYSTOLIC BLOOD PRESSURE: 128 MMHG | BODY MASS INDEX: 37.17 KG/M2 | HEART RATE: 85 BPM | DIASTOLIC BLOOD PRESSURE: 74 MMHG | OXYGEN SATURATION: 97 % | TEMPERATURE: 97.9 F | RESPIRATION RATE: 16 BRPM

## 2023-04-13 DIAGNOSIS — G47.33 OBSTRUCTIVE SLEEP APNEA: ICD-10-CM

## 2023-04-13 DIAGNOSIS — R73.01 IFG (IMPAIRED FASTING GLUCOSE): ICD-10-CM

## 2023-04-13 DIAGNOSIS — R03.0 ELEVATED BLOOD PRESSURE READING: ICD-10-CM

## 2023-04-13 DIAGNOSIS — E66.01 CLASS 2 SEVERE OBESITY DUE TO EXCESS CALORIES WITH SERIOUS COMORBIDITY AND BODY MASS INDEX (BMI) OF 36.0 TO 36.9 IN ADULT (HCC): ICD-10-CM

## 2023-04-13 DIAGNOSIS — E78.5 DYSLIPIDEMIA: ICD-10-CM

## 2023-04-13 PROBLEM — E66.812 CLASS 2 SEVERE OBESITY DUE TO EXCESS CALORIES WITH SERIOUS COMORBIDITY AND BODY MASS INDEX (BMI) OF 36.0 TO 36.9 IN ADULT (HCC): Status: ACTIVE | Noted: 2018-02-01

## 2023-04-13 PROCEDURE — 99214 OFFICE O/P EST MOD 30 MIN: CPT | Performed by: NURSE PRACTITIONER

## 2023-04-13 ASSESSMENT — FIBROSIS 4 INDEX: FIB4 SCORE: 0.37

## 2023-04-13 ASSESSMENT — PATIENT HEALTH QUESTIONNAIRE - PHQ9: CLINICAL INTERPRETATION OF PHQ2 SCORE: 0

## 2023-04-13 NOTE — ASSESSMENT & PLAN NOTE
Tuesday   Got a bp machine  Didn't have it at work  Has been having chest pain off and on for a while  Tuesday chest pain, should I go to ER?  Pain was left side lower bra line  Took asa then antacid  Face tingling  Called mom - should I go to ER? If the pain went a way after deep breathing  Checked bp at home left work 515, checked bp 45, face tingling had stopped but still had pain  Pain all through Wednesday,   Some today, none right now  bp at home since Tuesday    Takes 3 with a minute in between  140-160/    Wrist monitor  Follows all directions of checking    Dad has high bp and lipid

## 2023-04-14 ENCOUNTER — HOSPITAL ENCOUNTER (OUTPATIENT)
Dept: LAB | Facility: MEDICAL CENTER | Age: 33
End: 2023-04-14
Attending: NURSE PRACTITIONER
Payer: COMMERCIAL

## 2023-04-14 DIAGNOSIS — Z00.00 ROUTINE HEALTH MAINTENANCE: ICD-10-CM

## 2023-04-14 DIAGNOSIS — F33.1 MODERATE EPISODE OF RECURRENT MAJOR DEPRESSIVE DISORDER (HCC): ICD-10-CM

## 2023-04-14 DIAGNOSIS — R03.0 ELEVATED BLOOD PRESSURE READING: ICD-10-CM

## 2023-04-14 DIAGNOSIS — E78.5 DYSLIPIDEMIA: ICD-10-CM

## 2023-04-14 DIAGNOSIS — R73.01 IFG (IMPAIRED FASTING GLUCOSE): ICD-10-CM

## 2023-04-14 DIAGNOSIS — E55.9 VITAMIN D INSUFFICIENCY: ICD-10-CM

## 2023-04-14 DIAGNOSIS — E66.09 CLASS 1 OBESITY DUE TO EXCESS CALORIES WITH SERIOUS COMORBIDITY AND BODY MASS INDEX (BMI) OF 33.0 TO 33.9 IN ADULT: ICD-10-CM

## 2023-04-14 LAB
ALBUMIN SERPL BCP-MCNC: 4.6 G/DL (ref 3.2–4.9)
ALBUMIN/GLOB SERPL: 1.4 G/DL
ALP SERPL-CCNC: 92 U/L (ref 30–99)
ALT SERPL-CCNC: 80 U/L (ref 2–50)
ANION GAP SERPL CALC-SCNC: 14 MMOL/L (ref 7–16)
AST SERPL-CCNC: 41 U/L (ref 12–45)
BASOPHILS # BLD AUTO: 0.6 % (ref 0–1.8)
BASOPHILS # BLD: 0.04 K/UL (ref 0–0.12)
BILIRUB SERPL-MCNC: 0.6 MG/DL (ref 0.1–1.5)
BUN SERPL-MCNC: 8 MG/DL (ref 8–22)
CALCIUM ALBUM COR SERPL-MCNC: 8.6 MG/DL (ref 8.5–10.5)
CALCIUM SERPL-MCNC: 9.1 MG/DL (ref 8.5–10.5)
CHLORIDE SERPL-SCNC: 101 MMOL/L (ref 96–112)
CHOLEST SERPL-MCNC: 197 MG/DL (ref 100–199)
CO2 SERPL-SCNC: 23 MMOL/L (ref 20–33)
CREAT SERPL-MCNC: 0.66 MG/DL (ref 0.5–1.4)
EOSINOPHIL # BLD AUTO: 0.07 K/UL (ref 0–0.51)
EOSINOPHIL NFR BLD: 1.1 % (ref 0–6.9)
ERYTHROCYTE [DISTWIDTH] IN BLOOD BY AUTOMATED COUNT: 43.9 FL (ref 35.9–50)
EST. AVERAGE GLUCOSE BLD GHB EST-MCNC: 117 MG/DL
FASTING STATUS PATIENT QL REPORTED: NORMAL
GFR SERPLBLD CREATININE-BSD FMLA CKD-EPI: 118 ML/MIN/1.73 M 2
GLOBULIN SER CALC-MCNC: 3.2 G/DL (ref 1.9–3.5)
GLUCOSE SERPL-MCNC: 91 MG/DL (ref 65–99)
HBA1C MFR BLD: 5.7 % (ref 4–5.6)
HCT VFR BLD AUTO: 41.6 % (ref 37–47)
HDLC SERPL-MCNC: 40 MG/DL
HGB BLD-MCNC: 13.4 G/DL (ref 12–16)
IMM GRANULOCYTES # BLD AUTO: 0.01 K/UL (ref 0–0.11)
IMM GRANULOCYTES NFR BLD AUTO: 0.2 % (ref 0–0.9)
LDLC SERPL CALC-MCNC: 143 MG/DL
LYMPHOCYTES # BLD AUTO: 1.77 K/UL (ref 1–4.8)
LYMPHOCYTES NFR BLD: 27.7 % (ref 22–41)
MCH RBC QN AUTO: 28.6 PG (ref 27–33)
MCHC RBC AUTO-ENTMCNC: 32.2 G/DL (ref 33.6–35)
MCV RBC AUTO: 88.7 FL (ref 81.4–97.8)
MONOCYTES # BLD AUTO: 0.39 K/UL (ref 0–0.85)
MONOCYTES NFR BLD AUTO: 6.1 % (ref 0–13.4)
NEUTROPHILS # BLD AUTO: 4.1 K/UL (ref 2–7.15)
NEUTROPHILS NFR BLD: 64.3 % (ref 44–72)
NRBC # BLD AUTO: 0 K/UL
NRBC BLD-RTO: 0 /100 WBC
PLATELET # BLD AUTO: 346 K/UL (ref 164–446)
PMV BLD AUTO: 10.6 FL (ref 9–12.9)
POTASSIUM SERPL-SCNC: 4.3 MMOL/L (ref 3.6–5.5)
PROT SERPL-MCNC: 7.8 G/DL (ref 6–8.2)
RBC # BLD AUTO: 4.69 M/UL (ref 4.2–5.4)
SODIUM SERPL-SCNC: 138 MMOL/L (ref 135–145)
TRIGL SERPL-MCNC: 69 MG/DL (ref 0–149)
WBC # BLD AUTO: 6.4 K/UL (ref 4.8–10.8)

## 2023-04-14 PROCEDURE — 84443 ASSAY THYROID STIM HORMONE: CPT

## 2023-04-14 PROCEDURE — 80053 COMPREHEN METABOLIC PANEL: CPT

## 2023-04-14 PROCEDURE — 36415 COLL VENOUS BLD VENIPUNCTURE: CPT

## 2023-04-14 PROCEDURE — 82570 ASSAY OF URINE CREATININE: CPT

## 2023-04-14 PROCEDURE — 85025 COMPLETE CBC W/AUTO DIFF WBC: CPT

## 2023-04-14 PROCEDURE — 80061 LIPID PANEL: CPT

## 2023-04-14 PROCEDURE — 83036 HEMOGLOBIN GLYCOSYLATED A1C: CPT

## 2023-04-14 PROCEDURE — 82043 UR ALBUMIN QUANTITATIVE: CPT

## 2023-04-14 PROCEDURE — 82306 VITAMIN D 25 HYDROXY: CPT

## 2023-04-14 NOTE — PROGRESS NOTES
CC: Diagnoses of Elevated blood pressure reading, Class 2 severe obesity due to excess calories with serious comorbidity and body mass index (BMI) of 36.0 to 36.9 in adult (HCC), Dyslipidemia, IFG (impaired fasting glucose), and Obstructive sleep apnea were pertinent to this visit.                                                                                                                                       HPI:   Deysi presents today with the following concerns:    Class 2 severe obesity due to excess calories with serious comorbidity and body mass index (BMI) of 36.0 to 36.9 in adult (HCC)  Chronic, ongoing.  Patient is not currently working on improving her diet or exercise.  She recently had some elevated blood pressure readings and possible chest pain, she is not motivated to focus on her weight.  Due for updated annual labs.    Dyslipidemia  Chronic, ongoing without medication.  Due for updated annual labs.    Obstructive sleep apnea  New to examiner.  Now following with sleep medicine.  Is waiting for CPAP machine and supplies prior to starting nocturnal CPAP.    Elevated blood pressure reading  New to examiner.  Patient reports that on Tuesday while she was at work she had some intermittent left-sided chest/rib pain located near the base of her bra line.  She took an aspirin and then an antacid, then developed some face tingling sensation.  She called her mom who works in the medical field to review her symptoms and decide if she should be seen in the emergency room.  Her mother recommended that she take some deep breaths and try to calm down and check her blood pressure.  She did not have her blood pressure monitor with her at work, by the time she got home her face tingling had resolved, but was still having the aching pain in her left chest.  Blood pressure ranged 140-160/.  Reports that the pain persisted all through Wednesday and has decreased today and is currently gone.  She has been  "monitoring her blood pressure regularly at home, follows all directions of proper blood pressure monitoring technique using her wrist blood pressure monitor.  States that she did not bring her machine with her today.  She became concerned because her father has high blood pressure and cholesterol issues.    Patient Active Problem List    Diagnosis Date Noted    Elevated blood pressure reading 04/13/2023    Obstructive sleep apnea 03/13/2023    Snoring 03/13/2023    Sleep difficulties 12/11/2022    Positive test for herpes simplex virus (HSV) antibody 07/02/2020    FAUSTINO (generalized anxiety disorder) 06/08/2018    Headache 03/29/2018    PTSD (post-traumatic stress disorder) 02/03/2018    Class 2 severe obesity due to excess calories with serious comorbidity and body mass index (BMI) of 36.0 to 36.9 in adult ([facility]) 02/01/2018    Vitamin D insufficiency 02/01/2018    Dyslipidemia 02/01/2018    Moderate episode of recurrent major depressive disorder (HCC) 02/01/2018    Panic attacks 07/01/2013    Mild dysplasia of cervix (KAREL I) 03/16/2010     Current Outpatient Medications   Medication Sig Dispense Refill    buPROPion (WELLBUTRIN XL) 150 MG XL tablet Take 1 Tablet by mouth every morning. 90 Tablet 3    busPIRone (BUSPAR) 10 MG Tab tablet Take 1 Tablet by mouth 2 times a day. 180 Tablet 3    fluoxetine (PROZAC) 40 MG capsule Take 1 Capsule by mouth every day. 90 Capsule 3    tizanidine (ZANAFLEX) 4 MG Tab Take 1 Tab by mouth every 12 hours as needed (muscle stiffness). 180 Tab 0    Cyanocobalamin (VITAMIN B-12) 3000 MCG SL Tab Place  under tongue.      Cholecalciferol (VITAMIN D-3) 5000 units Tab Take  by mouth.       No current facility-administered medications for this visit.     Allergies as of 04/13/2023    (No Known Allergies)      ROS:  See HPI    /74 (BP Location: Left arm, Patient Position: Sitting, BP Cuff Size: Large adult)   Pulse 85   Temp 36.6 °C (97.9 °F) (Temporal)   Resp 16   Ht 1.575 m (5' 2\")  "  Wt 91.6 kg (202 lb)   LMP 04/10/2023   SpO2 97%   BMI 36.95 kg/m²     Physical Exam:  General: Normal appearing. No distress.  HEENT: Normocephalic. Eyes conjunctiva clear lids without ptosis, pupils equal and reactive to light accommodation, ears normal shape and contour.  Neck: Supple without JVD or bruit.   Pulmonary: Clear to ausculation.  Normal effort. No rales, rhonchi, or wheezing.  Cardiovascular: Regular rate and rhythm without murmur. Carotid and radial pulses are intact and equal bilaterally.  Neurologic: Grossly nonfocal.  Skin: Warm and dry.  No obvious lesions.  Musculoskeletal: Normal gait. No extremity cyanosis, clubbing, or edema.  Psych: Normal mood and affect. Alert and oriented x3. Judgment and insight is normal.     Assessment and Plan.   32 y.o. female with the following issues:  1. Elevated blood pressure reading  New to examiner.  Plan for patient to complete microalbumin creatinine ratio urine sample with annual labs previously ordered.  Encourage patient to continue monitoring blood pressure as directed and bring log as well as monitor to follow-up appointment.  Strongly encouraged the patient if she has any concerns that she should be seen in the emergency room for any symptom, she should be seen in the emergency room, patient understands.  - MICROALBUMIN CREAT RATIO URINE; Future    2. Class 2 severe obesity due to excess calories with serious comorbidity and body mass index (BMI) of 36.0 to 36.9 in adult (HCC)  3. Dyslipidemia  Encourage diet high in fruits, vegetables, and fiber. And a diet low in salt, refined carbohydrates, cholesterol, saturated fat, and trans fatty acids.    Encourage a minimum of 30 minutes of moderate intensity aerobic exercise (eg, brisk walking) is recommended on five days each week. Or 30 minutes of vigorous-intensity aerobic exercise (eg, jogging) on three days each week.   Patient's body mass index is 36.95 kg/m². Exercise and nutrition counseling were  performed at this visit.  Due for updated annual labs, previously ordered.    4. IFG (impaired fasting glucose)  Due for updated annual labs prior to follow-up.  - HEMOGLOBIN A1C; Future  - MICROALBUMIN CREAT RATIO URINE; Future    5. Obstructive sleep apnea  New to examiner, continue following a sleep medicine.  Start nocturnal CPAP as directed when supplies arrive.    Return in about 15 days (around 4/28/2023) for Preventative Annual, Follow up Labs.     Please note that this dictation was created using voice recognition software. I have worked with consultants from the vendor as well as technical experts from [employer] to optimize the interface. I have made every reasonable attempt to correct obvious errors, but I expect that there are errors of grammar and possibly content that I did not discover before finalizing the note.

## 2023-04-14 NOTE — ASSESSMENT & PLAN NOTE
New to examiner.  Patient reports that on Tuesday while she was at work she had some intermittent left-sided chest/rib pain located near the base of her bra line.  She took an aspirin and then an antacid, then developed some face tingling sensation.  She called her mom who works in the medical field to review her symptoms and decide if she should be seen in the emergency room.  Her mother recommended that she take some deep breaths and try to calm down and check her blood pressure.  She did not have her blood pressure monitor with her at work, by the time she got home her face tingling had resolved, but was still having the aching pain in her left chest.  Blood pressure ranged 140-160/.  Reports that the pain persisted all through Wednesday and has decreased today and is currently gone.  She has been monitoring her blood pressure regularly at home, follows all directions of proper blood pressure monitoring technique using her wrist blood pressure monitor.  States that she did not bring her machine with her today.  She became concerned because her father has high blood pressure and cholesterol issues.

## 2023-04-14 NOTE — ASSESSMENT & PLAN NOTE
Chronic, ongoing.  Patient is not currently working on improving her diet or exercise.  She recently had some elevated blood pressure readings and possible chest pain, she is not motivated to focus on her weight.  Due for updated annual labs.

## 2023-04-14 NOTE — ASSESSMENT & PLAN NOTE
New to examiner.  Now following with sleep medicine.  Is waiting for CPAP machine and supplies prior to starting nocturnal CPAP.

## 2023-04-15 LAB
25(OH)D3 SERPL-MCNC: 22 NG/ML (ref 30–100)
CREAT UR-MCNC: 18.77 MG/DL
MICROALBUMIN UR-MCNC: <1.2 MG/DL
MICROALBUMIN/CREAT UR: NORMAL MG/G (ref 0–30)
TSH SERPL DL<=0.005 MIU/L-ACNC: 1.29 UIU/ML (ref 0.38–5.33)

## 2023-04-28 ENCOUNTER — OFFICE VISIT (OUTPATIENT)
Dept: MEDICAL GROUP | Facility: PHYSICIAN GROUP | Age: 33
End: 2023-04-28
Payer: COMMERCIAL

## 2023-04-28 VITALS
SYSTOLIC BLOOD PRESSURE: 130 MMHG | WEIGHT: 197 LBS | DIASTOLIC BLOOD PRESSURE: 84 MMHG | BODY MASS INDEX: 36.25 KG/M2 | RESPIRATION RATE: 16 BRPM | HEIGHT: 62 IN | OXYGEN SATURATION: 97 % | HEART RATE: 85 BPM | TEMPERATURE: 98.7 F

## 2023-04-28 DIAGNOSIS — Z23 NEED FOR VACCINATION: ICD-10-CM

## 2023-04-28 DIAGNOSIS — Z00.00 ENCOUNTER FOR WELL ADULT EXAM WITHOUT ABNORMAL FINDINGS: ICD-10-CM

## 2023-04-28 DIAGNOSIS — R73.03 PREDIABETES: ICD-10-CM

## 2023-04-28 DIAGNOSIS — R03.0 ELEVATED BLOOD PRESSURE READING: ICD-10-CM

## 2023-04-28 DIAGNOSIS — E78.5 DYSLIPIDEMIA: ICD-10-CM

## 2023-04-28 PROCEDURE — 99395 PREV VISIT EST AGE 18-39: CPT | Mod: 25 | Performed by: NURSE PRACTITIONER

## 2023-04-28 PROCEDURE — 90471 IMMUNIZATION ADMIN: CPT | Performed by: NURSE PRACTITIONER

## 2023-04-28 PROCEDURE — 90746 HEPB VACCINE 3 DOSE ADULT IM: CPT | Performed by: NURSE PRACTITIONER

## 2023-04-28 ASSESSMENT — FIBROSIS 4 INDEX: FIB4 SCORE: 0.44

## 2023-04-28 NOTE — ASSESSMENT & PLAN NOTE
Chronic, ongoing.  Patient has continued to monitor blood pressure at home using wrist monitor and reports that it is generally 130s/90s.  She has had no further chest pain or numbness/tingling.

## 2023-04-28 NOTE — PROGRESS NOTES
Subjective:   CC:   Chief Complaint   Patient presents with    Annual Exam    Lab Follow-up     HPI:   Deysi Goodrich is a 32 y.o. female who presents for annual exam:    Elevated blood pressure reading  Chronic, ongoing.  Patient has continued to monitor blood pressure at home using wrist monitor and reports that it is generally 130s/90s.  She has had no further chest pain or numbness/tingling.     Anticipatory Guidance:  Cholesterol screenin2023   LDL controlled: 143  Diabetes screenin2023   A1c controlled: 5.7%   UALB/CR: 2023, normal  Diet: Recommend more lean meats, fruits, vegetables, whole grains. Working with her dad, he recommends Mediterranean diet.  She has cut out dairy, cut back on red meat, and is eating lean proteins.  Exercise: Encourage regular exercise. In the last week she has gone out daily with her dog, is walking 4 times a day with her boss at work.   Substance abuse: No  Safe in relationship: NA  Seatbelts, bike/motorcycle helmet: Yes  Sun protection: Yes  Dentist: Due for follow up  Eye doctor: Due for follow up    Cancer Screening:  Colorectal cancer screening: N/A, due at age 44  Cervical cancer screenin2022  Breast cancer screening: N/A, due at age 40    Infectious Disease Screening/Immunizations:  STI screenin, declines updating  Chlamydia/Gonorrhea screenin2022  Hep C screenin, nonreactive  HIV screen: 2020  Practices safe sex: Not currently sexually active    Immunizations:   Influenza: N/A   Tetanus: 2019   Pneumonia: N/A   Received HPV series: Patient does not recall    Preventative Care Screening:   Osteoporosis Screening: N/A, due at age 64  Tobacco Screening: Former smoker, quit  1 pack/day 3-year smoking history  AAA Screening: N/A    Patient's last menstrual period was 04/10/2023.  Hx STDs: Yes, HPV, Herpes  Birth control: None  Menses every month with 5 days of heavy bleeding, a day break, and then spotting for couple  of days.   Reports moderate cramping and does take OTC analgesics for cramps.  No significant bloating/fluid retention, pelvic pain, or dyspareunia. No abnormal vaginal discharge.  No breast tenderness, mass, nipple discharge, changes in size or contour, or abnormal cyclic discomfort.    OB History    Para Term  AB Living   1 0 0 0 1 0   SAB IAB Ectopic Molar Multiple Live Births   1 0 0 0 0 0   Obstetric Comments   Miscarriage      She  reports being sexually active and has had partner(s) who are male. She reports using the following method of birth control/protection: Condom.  She  has a past medical history of Anxiety, Bleeding nose (chronic), Chickenpox, Depression, Migraine ([id number]), Mild dysplasia of cervix (KAREL I) (2010), Palpitations (2012), PTSD (post-traumatic stress disorder), and Syncope (2012).  She  has a past surgical history that includes dental extraction(s) ().    Family History   Problem Relation Age of Onset    Cancer Mother         Cervical    Depression Mother     Anxiety disorder Mother     Alcohol/Drug Mother     Hypertension Father     Anxiety disorder Father     Heart Disease Father     Alcohol abuse Father     Hyperlipidemia Father     No Known Problems Sister     Alcohol/Drug Maternal Uncle     Anxiety disorder Paternal Aunt     Depression Paternal Aunt     Colon Cancer Paternal Aunt 58    Alcohol abuse Paternal Uncle     Depression Maternal Grandmother     Anxiety disorder Maternal Grandmother     Heart Attack Maternal Grandfather     Alcohol abuse Maternal Grandfather     Depression Paternal Grandmother     Anxiety disorder Paternal Grandmother     Alcohol abuse Paternal Grandmother     Diabetes Paternal Grandfather     Hypertension Paternal Grandfather     Alcohol abuse Paternal Grandfather     Anxiety disorder Paternal Aunt     Depression Paternal Aunt     Alcohol abuse Paternal Aunt     Anxiety disorder Paternal Aunt     Depression Paternal  Aunt     Alcohol/Drug Maternal Uncle     Stroke Neg Hx      Social History     Tobacco Use    Smoking status: Former     Packs/day: 1.00     Years: 3.00     Pack years: 3.00     Types: Cigarettes     Quit date: 2009     Years since quittin.4    Smokeless tobacco: Never   Vaping Use    Vaping Use: Never used   Substance Use Topics    Alcohol use: Yes     Alcohol/week: 1.2 oz     Types: 2 Shots of liquor per week     Comment: occ    Drug use: No     Patient Active Problem List    Diagnosis Date Noted    Elevated blood pressure reading 2023    Obstructive sleep apnea 2023    Snoring 2023    Sleep difficulties 2022    Positive test for herpes simplex virus (HSV) antibody 2020    FAUSTINO (generalized anxiety disorder) 2018    Headache 2018    PTSD (post-traumatic stress disorder) 2018    Class 2 severe obesity due to excess calories with serious comorbidity and body mass index (BMI) of 36.0 to 36.9 in adult ([facility]) 2018    Vitamin D insufficiency 2018    Dyslipidemia 2018    Moderate episode of recurrent major depressive disorder ([facility]) 2018    Panic attacks 2013    Mild dysplasia of cervix (KAREL I) 2010     Current Outpatient Medications   Medication Sig Dispense Refill    buPROPion (WELLBUTRIN XL) 150 MG XL tablet Take 1 Tablet by mouth every morning. 90 Tablet 3    busPIRone (BUSPAR) 10 MG Tab tablet Take 1 Tablet by mouth 2 times a day. 180 Tablet 3    fluoxetine (PROZAC) 40 MG capsule Take 1 Capsule by mouth every day. 90 Capsule 3    tizanidine (ZANAFLEX) 4 MG Tab Take 1 Tab by mouth every 12 hours as needed (muscle stiffness). 180 Tab 0    Cyanocobalamin (VITAMIN B-12) 3000 MCG SL Tab Place  under tongue.      Cholecalciferol (VITAMIN D-3) 5000 units Tab Take  by mouth.       No current facility-administered medications for this visit.     No Known Allergies  Review of Systems   Constitutional: Negative for fever, chills and  "malaise/fatigue.   HENT: Negative for congestion.    Eyes: Negative for pain.   Respiratory: Negative for cough and shortness of breath.    Cardiovascular: Negative for chest pain and leg swelling.   Gastrointestinal: Negative for nausea, vomiting, abdominal pain and diarrhea.   Genitourinary: Negative for dysuria and hematuria.   Skin: Negative for rash.   Neurological: Negative for dizziness, focal weakness and headaches.   Endo/Heme/Allergies: Does not bruise/bleed easily.   Psychiatric/Behavioral: Negative for depression.  The patient is not nervous/anxious.      Objective:   /84 (BP Location: Left arm, Patient Position: Sitting, BP Cuff Size: Large adult)   Pulse 85   Temp 37.1 °C (98.7 °F) (Temporal)   Resp 16   Ht 1.575 m (5' 2\")   Wt 89.4 kg (197 lb)   LMP 04/10/2023   SpO2 97%   BMI 36.03 kg/m²     Wt Readings from Last 4 Encounters:   04/28/23 89.4 kg (197 lb)   04/13/23 91.6 kg (202 lb)   03/13/23 86.2 kg (190 lb)   12/07/22 83.9 kg (185 lb)     Physical Exam:  Constitutional: Well-developed and well-nourished. Not diaphoretic. No distress.   Skin: Skin is warm and dry. No rash noted.  Head: Atraumatic without lesions.  Eyes: Conjunctivae and extraocular motions are normal. Pupils are equal, round, and reactive to light. No scleral icterus.   Ears:  External ears unremarkable. Tympanic membranes clear and intact.  Nose: Nares patent. Septum midline. Turbinates without erythema nor edema. No discharge.   Mouth/Throat: Tongue normal. Oropharynx is clear and moist. Posterior pharynx without erythema or exudates.  Neck: Supple, trachea midline. Normal range of motion. No thyromegaly present. No lymphadenopathy--cervical or supraclavicular.  Cardiovascular: Regular rate and rhythm, S1 and S2 without murmur, rubs, or gallops.    Respiratory: Effort normal. Clear to auscultation throughout. No adventitious sounds.   Breast:  Breast exam deferred. Discussed monthly self exams and what to look for, " including peau d'orange or nipple retraction, discharge, breasts moving freely and equally without restriction, axillary/supraclavicular adenopathy, or palpable masses/nodules.  Abdomen: Soft, non tender, and without distention. Active bowel sounds in all four quadrants. No rebound, guarding.  Extremities: No cyanosis, clubbing, erythema, nor edema. Radial pulses intact and symmetric.   Musculoskeletal: All major joints AROM full in all directions without pain.  Neurological: Alert and oriented x 3. Grossly non-focal. Strength and sensation grossly intact.   Psychiatric:  Behavior, mood, and affect are appropriate.    Assessment and Plan:   1. Encounter for well adult exam without abnormal findings    2. Dyslipidemia  3. Prediabetes  Chronic, ongoing without medication.  Encourage patient to continue working on improving diet, exercise, weight loss.  Plan to repeat labs in 6 months and follow-up to review results.  - HEMOGLOBIN A1C; Future  - Comp Metabolic Panel; Future  - Lipid Profile; Future    4. Elevated blood pressure reading  Chronic, ongoing.  Continue to monitor blood pressure at home regularly, return to be seen if consistently >140/>90.    5. Need for vaccination  Given today, due for second dose after 5/26/2023.  - Hep B Adult 20+     Health maintenance: Up-to-date  Labs per orders  Immunizations: Per orders  Patient counseled about skin care, diet, supplements, and exercise.  Discussed  breast self exam, mammography screening, STD prevention, HIV risk factors and prevention, menopause, osteoporosis, adequate intake of calcium and vitamin D, diet and exercise, colorectal cancer screening     Follow-up: Return in about 6 months (around 10/28/2023) for Hep B MA 1  month -, Follow up Labs.     I have placed the below orders and discussed them with an approved delegating provider. The MA is performing the below orders under the direction of Dr. Taylor.      Please note that this dictation was created using  voice recognition software. I have worked with consultants from the vendor as well as technical experts from [facility] to optimize the interface. I have made every reasonable attempt to correct obvious errors, but I expect that there are errors of grammar and possibly content that I did not discover before finalizing the note.

## 2023-05-30 ENCOUNTER — NON-PROVIDER VISIT (OUTPATIENT)
Dept: MEDICAL GROUP | Facility: PHYSICIAN GROUP | Age: 33
End: 2023-05-30
Payer: COMMERCIAL

## 2023-05-30 DIAGNOSIS — Z23 NEED FOR VACCINATION: ICD-10-CM

## 2023-05-30 NOTE — PROGRESS NOTES
"Deysi Goodrich is a 32 y.o. female here for a non-provider visit for:   HEPATITIS B 2 of 3    Reason for immunization: Overdue/Provider Recommended  Immunization records indicate need for vaccine: Yes, confirmed with Epic  Minimum interval has been met for this vaccine: Yes  ABN completed: Not Indicated    VIS Dated  5/12/23 was given to patient: Yes  All IAC Questionnaire questions were answered \"No.\"    Patient tolerated injection and no adverse effects were observed or reported: Yes    Pt scheduled for next dose in series: No    "

## 2023-05-31 PROCEDURE — 90746 HEPB VACCINE 3 DOSE ADULT IM: CPT | Performed by: NURSE PRACTITIONER

## 2023-05-31 PROCEDURE — 90471 IMMUNIZATION ADMIN: CPT | Performed by: NURSE PRACTITIONER

## 2023-06-15 ENCOUNTER — APPOINTMENT (RX ONLY)
Dept: URBAN - METROPOLITAN AREA CLINIC 22 | Facility: CLINIC | Age: 33
Setting detail: DERMATOLOGY
End: 2023-06-15

## 2023-06-15 DIAGNOSIS — D18.0 HEMANGIOMA: ICD-10-CM

## 2023-06-15 DIAGNOSIS — D22 MELANOCYTIC NEVI: ICD-10-CM

## 2023-06-15 DIAGNOSIS — Z71.89 OTHER SPECIFIED COUNSELING: ICD-10-CM

## 2023-06-15 DIAGNOSIS — L81.4 OTHER MELANIN HYPERPIGMENTATION: ICD-10-CM

## 2023-06-15 DIAGNOSIS — L71.8 OTHER ROSACEA: ICD-10-CM | Status: INADEQUATELY CONTROLLED

## 2023-06-15 PROBLEM — D22.5 MELANOCYTIC NEVI OF TRUNK: Status: ACTIVE | Noted: 2023-06-15

## 2023-06-15 PROBLEM — D18.01 HEMANGIOMA OF SKIN AND SUBCUTANEOUS TISSUE: Status: ACTIVE | Noted: 2023-06-15

## 2023-06-15 PROCEDURE — 99214 OFFICE O/P EST MOD 30 MIN: CPT

## 2023-06-15 PROCEDURE — ? PRESCRIPTION

## 2023-06-15 PROCEDURE — ? SUNSCREEN RECOMMENDATIONS

## 2023-06-15 PROCEDURE — ? COUNSELING

## 2023-06-15 RX ORDER — DOXYCYCLINE HYCLATE 20 MG/1
TABLET, FILM COATED ORAL BID
Qty: 180 | Refills: 1 | Status: ERX | COMMUNITY
Start: 2023-06-15

## 2023-06-15 RX ORDER — METRONIDAZOLE 7.5 MG/G
CREAM TOPICAL BID
Qty: 45 | Refills: 5 | Status: ERX | COMMUNITY
Start: 2023-06-15

## 2023-06-15 RX ADMIN — METRONIDAZOLE: 7.5 CREAM TOPICAL at 00:00

## 2023-06-15 RX ADMIN — DOXYCYCLINE HYCLATE: 20 TABLET, FILM COATED ORAL at 00:00

## 2023-06-15 ASSESSMENT — LOCATION DETAILED DESCRIPTION DERM
LOCATION DETAILED: LEFT INFERIOR CENTRAL MALAR CHEEK
LOCATION DETAILED: RIGHT CENTRAL MALAR CHEEK
LOCATION DETAILED: LEFT LOWER CUTANEOUS LIP
LOCATION DETAILED: EPIGASTRIC SKIN
LOCATION DETAILED: LEFT SUPERIOR MEDIAL FOREHEAD
LOCATION DETAILED: LEFT SUPERIOR MEDIAL UPPER BACK

## 2023-06-15 ASSESSMENT — LOCATION SIMPLE DESCRIPTION DERM
LOCATION SIMPLE: LEFT FOREHEAD
LOCATION SIMPLE: ABDOMEN
LOCATION SIMPLE: LEFT CHEEK
LOCATION SIMPLE: LEFT UPPER BACK
LOCATION SIMPLE: LEFT LIP
LOCATION SIMPLE: RIGHT CHEEK

## 2023-06-15 ASSESSMENT — LOCATION ZONE DERM
LOCATION ZONE: FACE
LOCATION ZONE: TRUNK
LOCATION ZONE: LIP

## 2023-06-15 ASSESSMENT — SEVERITY ASSESSMENT OVERALL AMONG ALL PATIENTS
IN YOUR EXPERIENCE, AMONG ALL PATIENTS YOU HAVE SEEN WITH THIS CONDITION, HOW SEVERE IS THIS PATIENT'S CONDITION?: MILD TO MODERATE

## 2023-10-24 ENCOUNTER — HOSPITAL ENCOUNTER (OUTPATIENT)
Dept: LAB | Facility: MEDICAL CENTER | Age: 33
End: 2023-10-24
Attending: NURSE PRACTITIONER
Payer: COMMERCIAL

## 2023-10-24 DIAGNOSIS — R73.03 PREDIABETES: ICD-10-CM

## 2023-10-24 DIAGNOSIS — E78.5 DYSLIPIDEMIA: ICD-10-CM

## 2023-10-24 LAB
ALBUMIN SERPL BCP-MCNC: 4.7 G/DL (ref 3.2–4.9)
ALBUMIN/GLOB SERPL: 1.7 G/DL
ALP SERPL-CCNC: 86 U/L (ref 30–99)
ALT SERPL-CCNC: 70 U/L (ref 2–50)
ANION GAP SERPL CALC-SCNC: 13 MMOL/L (ref 7–16)
AST SERPL-CCNC: 34 U/L (ref 12–45)
BILIRUB SERPL-MCNC: 0.4 MG/DL (ref 0.1–1.5)
BUN SERPL-MCNC: 15 MG/DL (ref 8–22)
CALCIUM ALBUM COR SERPL-MCNC: 8.8 MG/DL (ref 8.5–10.5)
CALCIUM SERPL-MCNC: 9.4 MG/DL (ref 8.5–10.5)
CHLORIDE SERPL-SCNC: 106 MMOL/L (ref 96–112)
CHOLEST SERPL-MCNC: 212 MG/DL (ref 100–199)
CO2 SERPL-SCNC: 22 MMOL/L (ref 20–33)
CREAT SERPL-MCNC: 0.69 MG/DL (ref 0.5–1.4)
EST. AVERAGE GLUCOSE BLD GHB EST-MCNC: 111 MG/DL
FASTING STATUS PATIENT QL REPORTED: NORMAL
GFR SERPLBLD CREATININE-BSD FMLA CKD-EPI: 117 ML/MIN/1.73 M 2
GLOBULIN SER CALC-MCNC: 2.8 G/DL (ref 1.9–3.5)
GLUCOSE SERPL-MCNC: 103 MG/DL (ref 65–99)
HBA1C MFR BLD: 5.5 % (ref 4–5.6)
HDLC SERPL-MCNC: 38 MG/DL
LDLC SERPL CALC-MCNC: 152 MG/DL
POTASSIUM SERPL-SCNC: 4.7 MMOL/L (ref 3.6–5.5)
PROT SERPL-MCNC: 7.5 G/DL (ref 6–8.2)
SODIUM SERPL-SCNC: 141 MMOL/L (ref 135–145)
TRIGL SERPL-MCNC: 111 MG/DL (ref 0–149)

## 2023-10-24 PROCEDURE — 80061 LIPID PANEL: CPT

## 2023-10-24 PROCEDURE — 83036 HEMOGLOBIN GLYCOSYLATED A1C: CPT

## 2023-10-24 PROCEDURE — 36415 COLL VENOUS BLD VENIPUNCTURE: CPT

## 2023-10-24 PROCEDURE — 80053 COMPREHEN METABOLIC PANEL: CPT

## 2023-10-31 ENCOUNTER — APPOINTMENT (OUTPATIENT)
Dept: MEDICAL GROUP | Facility: PHYSICIAN GROUP | Age: 33
End: 2023-10-31
Payer: COMMERCIAL

## 2023-11-01 ENCOUNTER — APPOINTMENT (OUTPATIENT)
Dept: MEDICAL GROUP | Facility: PHYSICIAN GROUP | Age: 33
End: 2023-11-01
Payer: COMMERCIAL

## 2023-12-06 NOTE — TELEPHONE ENCOUNTER
Was the patient seen in the last year in this department? Yes    Does patient have an active prescription for medications requested? No     Received Request Via: Pharmacy   15

## 2024-04-23 ENCOUNTER — APPOINTMENT (OUTPATIENT)
Dept: ADMISSIONS | Facility: MEDICAL CENTER | Age: 34
End: 2024-04-23
Attending: OBSTETRICS & GYNECOLOGY
Payer: COMMERCIAL

## 2024-05-02 ENCOUNTER — PRE-ADMISSION TESTING (OUTPATIENT)
Dept: ADMISSIONS | Facility: MEDICAL CENTER | Age: 34
End: 2024-05-02
Attending: UROLOGY
Payer: COMMERCIAL

## 2024-05-10 ENCOUNTER — PRE-ADMISSION TESTING (OUTPATIENT)
Dept: ADMISSIONS | Facility: MEDICAL CENTER | Age: 34
End: 2024-05-10
Attending: OBSTETRICS & GYNECOLOGY
Payer: COMMERCIAL

## 2024-05-10 DIAGNOSIS — Z01.810 PRE-OPERATIVE CARDIOVASCULAR EXAMINATION: ICD-10-CM

## 2024-05-10 DIAGNOSIS — Z01.812 PRE-OPERATIVE LABORATORY EXAMINATION: ICD-10-CM

## 2024-05-10 LAB
ALBUMIN SERPL BCP-MCNC: 4.7 G/DL (ref 3.2–4.9)
ALBUMIN/GLOB SERPL: 1.7 G/DL
ALP SERPL-CCNC: 99 U/L (ref 30–99)
ALT SERPL-CCNC: 59 U/L (ref 2–50)
ANION GAP SERPL CALC-SCNC: 12 MMOL/L (ref 7–16)
APPEARANCE UR: CLEAR
AST SERPL-CCNC: 25 U/L (ref 12–45)
B-HCG SERPL-ACNC: <1 MIU/ML (ref 0–5)
BASOPHILS # BLD AUTO: 0.6 % (ref 0–1.8)
BASOPHILS # BLD: 0.05 K/UL (ref 0–0.12)
BILIRUB SERPL-MCNC: 0.6 MG/DL (ref 0.1–1.5)
BILIRUB UR QL STRIP.AUTO: NEGATIVE
BUN SERPL-MCNC: 13 MG/DL (ref 8–22)
CALCIUM ALBUM COR SERPL-MCNC: 9.1 MG/DL (ref 8.5–10.5)
CALCIUM SERPL-MCNC: 9.7 MG/DL (ref 8.5–10.5)
CHLORIDE SERPL-SCNC: 103 MMOL/L (ref 96–112)
CO2 SERPL-SCNC: 24 MMOL/L (ref 20–33)
COLOR UR: YELLOW
CREAT SERPL-MCNC: 0.62 MG/DL (ref 0.5–1.4)
EKG IMPRESSION: NORMAL
EOSINOPHIL # BLD AUTO: 0.1 K/UL (ref 0–0.51)
EOSINOPHIL NFR BLD: 1.3 % (ref 0–6.9)
ERYTHROCYTE [DISTWIDTH] IN BLOOD BY AUTOMATED COUNT: 43.2 FL (ref 35.9–50)
EST. AVERAGE GLUCOSE BLD GHB EST-MCNC: 108 MG/DL
GFR SERPLBLD CREATININE-BSD FMLA CKD-EPI: 119 ML/MIN/1.73 M 2
GLOBULIN SER CALC-MCNC: 2.8 G/DL (ref 1.9–3.5)
GLUCOSE SERPL-MCNC: 94 MG/DL (ref 65–99)
GLUCOSE UR STRIP.AUTO-MCNC: NEGATIVE MG/DL
HBA1C MFR BLD: 5.4 % (ref 4–5.6)
HCT VFR BLD AUTO: 42.3 % (ref 37–47)
HGB BLD-MCNC: 13.8 G/DL (ref 12–16)
IMM GRANULOCYTES # BLD AUTO: 0.02 K/UL (ref 0–0.11)
IMM GRANULOCYTES NFR BLD AUTO: 0.3 % (ref 0–0.9)
KETONES UR STRIP.AUTO-MCNC: ABNORMAL MG/DL
LEUKOCYTE ESTERASE UR QL STRIP.AUTO: NEGATIVE
LYMPHOCYTES # BLD AUTO: 2.28 K/UL (ref 1–4.8)
LYMPHOCYTES NFR BLD: 29.1 % (ref 22–41)
MCH RBC QN AUTO: 28.6 PG (ref 27–33)
MCHC RBC AUTO-ENTMCNC: 32.6 G/DL (ref 32.2–35.5)
MCV RBC AUTO: 87.6 FL (ref 81.4–97.8)
MICRO URNS: ABNORMAL
MONOCYTES # BLD AUTO: 0.48 K/UL (ref 0–0.85)
MONOCYTES NFR BLD AUTO: 6.1 % (ref 0–13.4)
NEUTROPHILS # BLD AUTO: 4.91 K/UL (ref 1.82–7.42)
NEUTROPHILS NFR BLD: 62.6 % (ref 44–72)
NITRITE UR QL STRIP.AUTO: NEGATIVE
NRBC # BLD AUTO: 0 K/UL
NRBC BLD-RTO: 0 /100 WBC (ref 0–0.2)
PH UR STRIP.AUTO: 5.5 [PH] (ref 5–8)
PLATELET # BLD AUTO: 306 K/UL (ref 164–446)
PMV BLD AUTO: 10.8 FL (ref 9–12.9)
POTASSIUM SERPL-SCNC: 4.9 MMOL/L (ref 3.6–5.5)
PROT SERPL-MCNC: 7.5 G/DL (ref 6–8.2)
PROT UR QL STRIP: NEGATIVE MG/DL
RBC # BLD AUTO: 4.83 M/UL (ref 4.2–5.4)
RBC UR QL AUTO: NEGATIVE
SODIUM SERPL-SCNC: 139 MMOL/L (ref 135–145)
SP GR UR STRIP.AUTO: 1.03
UROBILINOGEN UR STRIP.AUTO-MCNC: 0.2 MG/DL
WBC # BLD AUTO: 7.8 K/UL (ref 4.8–10.8)

## 2024-05-10 PROCEDURE — 84702 CHORIONIC GONADOTROPIN TEST: CPT

## 2024-05-10 PROCEDURE — 85025 COMPLETE CBC W/AUTO DIFF WBC: CPT

## 2024-05-10 PROCEDURE — 36415 COLL VENOUS BLD VENIPUNCTURE: CPT

## 2024-05-10 PROCEDURE — 80053 COMPREHEN METABOLIC PANEL: CPT

## 2024-05-10 PROCEDURE — 81003 URINALYSIS AUTO W/O SCOPE: CPT

## 2024-05-10 PROCEDURE — 93005 ELECTROCARDIOGRAM TRACING: CPT

## 2024-05-10 PROCEDURE — 83036 HEMOGLOBIN GLYCOSYLATED A1C: CPT

## 2024-05-10 PROCEDURE — 93010 ELECTROCARDIOGRAM REPORT: CPT | Performed by: INTERNAL MEDICINE

## 2024-05-10 NOTE — OR NURSING
Patient called preadmit , concerned about her EKG results. Patient is wondering why is abnormal. I explained that based of our guidelines her  EKG result is not deemed to notify the surgeon. I  still faxed the results to Dr Leonard making sure that she will be looking at this EKG as well.Patient was very appreciative .

## 2024-05-20 NOTE — H&P
DATE OF ADMISSION:  05/10/2024     PREOPERATIVE HISTORY AND PHYSICAL     The patient is scheduled for surgery on 05/23/2024.     CHIEF COMPLAINT:    1.  Desires sterilization.  2.  Menorrhagia with endometrial polyp.  3.  Left adnexal cyst.     PLANNED SURGERY:    1.  Operative laparoscopy with bilateral salpingectomy and removal of adnexal   cyst with possible oophorectomy.  2.  Hysteroscopy with resection of endometrial polyp and endometrial ablation.     HISTORY OF PRESENT ILLNESS:  The patient is a 33-year-old nulliparous female   desiring permanent sterilization.  She suffers menorrhagia and has an   endometrial polyp, which requires treatment.  She is also found to have a   persistent 3.8 cm left adnexal cyst that is complex in nature with an   intramural nodule and requires treatment.  This finding is presumptively a   paratubal cyst, but may involve the ovary.    The patient reports that she would like to keep her plans for sterilization   confidential from her parents who will be present with her at the time of   surgery.  I did advise that oftentimes questions are asked in the presence of   family members and she may wish to have those family members step out for   certain parts of the discussion.     PAST MEDICAL HISTORY:    1.  PTSD.  2.  Migraine headache.  3.  Depression.     PAST SURGICAL HISTORY:  Negative.     FAMILY HISTORY:  Mother, cervical cancer and mental illness.  Father, high   blood pressure, mental illness and heart disease.     SOCIAL HISTORY:  She is single.  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  Alcohol is moderate 4-5 per week.  She is a past smoker,   but quit over 10 years ago.  She denies use of recreational drugs.       CURRENT MEDICATIONS:  Tizanidine 4 mg.     ALLERGIES:  None.     PHYSICAL EXAMINATION:    VITAL SIGNS:  Blood pressure 124/70, weight 206, height 5 feet 2, body mass   index 37.  GENERAL:  Well-developed, well-nourished female in no  acute distress.  HEENT:  Normocephalic, atraumatic.  Eyes, ears, nose, throat grossly within   normal limits.  NECK:  No thyromegaly.  LUNGS:  Clear.  HEART:  Regular rate and rhythm without murmur.  ABDOMEN:  Nontender, no mass.  PELVIC:  External genitalia normal.  Vagina, no discharge.  Cervix,   nulliparous no lesion.  Uterus normal size, mobile, nontender.  Adnexa, no   palpable mass.     IMPRESSION:    1.  Menorrhagia with normal preoperative hemoglobin and TSH.  2.  Endometrial mass.  Endometrial biopsy identified fragments of polyp.  3.  Desires sterilization.  4.  Persistent adnexal cyst.  The cyst appears in the left adnexal region with   an intramural nodule at 3.8 cm.     PLAN:  The patient will undergo laparoscopic bilateral salpingectomy,   laparoscopic excision of adnexal cyst, hysteroscopy with resection of   endometrial polyp, endometrial ablation.  She understands alternatives,   benefits and risks of the planned surgery including anesthetic risks of   aspiration pneumonia, heart attack, stroke, death.  She understands risks of   sterilization including the fact that this is a permanent procedure.  She   understands risks of laparoscopy including the risk of infection, bleeding,   and need for transfusion, risk of injury to surrounding structures such as   bowel and bladder with the risk of requiring laparotomy should such injury   occur.  She understands that oophorectomy may be required for treatment of the   adnexal cyst as the ultrasound is not definitive as to location.  She   understands that with hysteroscopy and endometrial ablation, there is a risk   of infection, bleeding, and uterine perforation.  She understands that   endometrial ablation will not guarantee absence of menses in the future.  It   is only meant to treat heavy menses and an acceptable outcome would be regular   but lighter menses. With this knowledge, she wishes to proceed with surgery.     I have discussed the risks of  postoperative pain management.  She has   completed the patient risk assessment and I have reviewed the medical records.    I have discussed the risk of narcotic use including the risk of abuse   dependence and addiction.  I have explained that following major surgery,   narcotic pain management is typically required, but only for short-term acute   pain management.  We have discussed alternatives, proper use, storage and   disposal and she is advised not to share or sell any unused products.     She is given prescriptions including ibuprofen 600 mg, 30 tablet one refill,   Percocet tablets 5/325 mg, 8 tablets zero refill and ondansetron 4 mg 8   tablets, 1 refill        ______________________________  MD HAYLEY AU/VIJI    DD:  05/20/2024 16:24  DT:  05/20/2024 16:58    Job#:  [id number]

## 2024-05-21 NOTE — H&P
DATE OF ADMISSION:  05/23/2024     PREOPERATIVE HISTORY AND PHYSICAL     CHIEF COMPLAINT:   1.  Desires sterilization.  2.  Menorrhagia with endometrial polyp.  3.  Left adnexal cyst.     PLANNED SURGERY:   1.  Operative laparoscopy with bilateral salpingectomy and removal of adnexal   cyst with possible oophorectomy.  2.  Hysteroscopy with resection of endometrial polyp and endometrial ablation.     HISTORY OF PRESENT ILLNESS:  The patient is a 33-year-old nulliparous female   desiring permanent sterilization.  She suffers menorrhagia and has an   endometrial polyp, which requires treatment.  She is also found to have a   persistent 3.8 cm left adnexal cyst that is complex in nature with an   intramural nodule and requires treatment.  This finding is presumptively a   paratubal cyst, but may involve the ovary.    The patient reports that she would like to keep her plans for sterilization   confidential from her parents who will be present with her at the time of   surgery.  I did advise that oftentimes questions are asked in the presence of   family members and she may wish to have those family members step out for   certain parts of the discussion.     PAST MEDICAL HISTORY:   1.  PTSD.  2.  Migraine headache.  3.  Depression.     PAST SURGICAL HISTORY:  Negative.     FAMILY HISTORY:  Mother, cervical cancer and mental illness.  Father, high   blood pressure, mental illness and heart disease.     SOCIAL HISTORY:  She is single.  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  Alcohol is moderate 4-5 per week.  She is a past smoker,   but quit over 10 years ago.  She denies use of recreational drugs.      CURRENT MEDICATIONS:  Tizanidine 4 mg.     ALLERGIES:  None.     PHYSICAL EXAMINATION:    VITAL SIGNS:  Blood pressure 124/70, weight 206, height 5 feet 2, body mass   index 37.  GENERAL:  Well-developed, well-nourished female in no acute distress.  HEENT:  Normocephalic, atraumatic.  Eyes, ears,  nose, throat grossly within   normal limits.  NECK:  No thyromegaly.  LUNGS:  Clear.  HEART:  Regular rate and rhythm without murmur.  ABDOMEN:  Nontender, no mass.  PELVIC:  External genitalia normal.  Vagina, no discharge.  Cervix,   nulliparous no lesion.  Uterus normal size, mobile, nontender.  Adnexa, no   palpable mass.     IMPRESSION:   1.  Menorrhagia with normal preoperative hemoglobin and TSH.  2.  Endometrial mass.  Endometrial biopsy identified fragments of polyp.  3.  Desires sterilization.  4.  Persistent adnexal cyst.  The cyst appears in the left adnexal region with   an intramural nodule at 3.8 cm.     PLAN:  The patient will undergo laparoscopic bilateral salpingectomy,   laparoscopic excision of adnexal cyst, hysteroscopy with resection of   endometrial polyp, endometrial ablation.  She understands alternatives,   benefits and risks of the planned surgery including anesthetic risks of   aspiration pneumonia, heart attack, stroke, death.  She understands risks of   sterilization including the fact that this is a permanent procedure.  She   understands risks of laparoscopy including the risk of infection, bleeding,   and need for transfusion, risk of injury to surrounding structures such as   bowel and bladder with the risk of requiring laparotomy should such injury   occur.  She understands that oophorectomy may be required for treatment of the   adnexal cyst as the ultrasound is not definitive as to location.  She   understands that with hysteroscopy and endometrial ablation, there is a risk   of infection, bleeding, and uterine perforation.  She understands that   endometrial ablation will not guarantee absence of menses in the future.  It   is only meant to treat heavy menses and an acceptable outcome would be regular   but lighter menses. With this knowledge, she wishes to proceed with surgery.     I have discussed the risks of postoperative pain management.  She has   completed the patient  risk assessment and I have reviewed the medical records.    I have discussed the risk of narcotic use including the risk of abuse   dependence and addiction.  I have explained that following major surgery,   narcotic pain management is typically required, but only for short-term acute   pain management.  We have discussed alternatives, proper use, storage and   disposal and she is advised not to share or sell any unused products.     She is given prescriptions including ibuprofen 600 mg, 30 tablet one refill,   Percocet tablets 5/325 mg, 8 tablets zero refill and ondansetron 4 mg 8   tablets, 1 refill        ______________________________  MD HAYLEY AU/VIJI    DD:  05/20/2024 16:24  DT:  05/20/2024 16:58    Job#:  [id number]

## 2024-05-22 ENCOUNTER — ANESTHESIA EVENT (OUTPATIENT)
Dept: SURGERY | Facility: MEDICAL CENTER | Age: 34
End: 2024-05-22
Payer: COMMERCIAL

## 2024-05-23 ENCOUNTER — HOSPITAL ENCOUNTER (OUTPATIENT)
Facility: MEDICAL CENTER | Age: 34
End: 2024-05-23
Attending: OBSTETRICS & GYNECOLOGY | Admitting: OBSTETRICS & GYNECOLOGY
Payer: COMMERCIAL

## 2024-05-23 ENCOUNTER — ANESTHESIA (OUTPATIENT)
Dept: SURGERY | Facility: MEDICAL CENTER | Age: 34
End: 2024-05-23
Payer: COMMERCIAL

## 2024-05-23 VITALS
HEART RATE: 95 BPM | SYSTOLIC BLOOD PRESSURE: 135 MMHG | BODY MASS INDEX: 36.09 KG/M2 | OXYGEN SATURATION: 93 % | RESPIRATION RATE: 14 BRPM | HEIGHT: 63 IN | WEIGHT: 203.71 LBS | TEMPERATURE: 97.8 F | DIASTOLIC BLOOD PRESSURE: 84 MMHG

## 2024-05-23 LAB
HCG UR QL: NEGATIVE
PATHOLOGY CONSULT NOTE: NORMAL

## 2024-05-23 PROCEDURE — 81025 URINE PREGNANCY TEST: CPT

## 2024-05-23 PROCEDURE — 700102 HCHG RX REV CODE 250 W/ 637 OVERRIDE(OP): Performed by: ANESTHESIOLOGY

## 2024-05-23 PROCEDURE — 160025 RECOVERY II MINUTES (STATS): Performed by: OBSTETRICS & GYNECOLOGY

## 2024-05-23 PROCEDURE — 160035 HCHG PACU - 1ST 60 MINS PHASE I: Performed by: OBSTETRICS & GYNECOLOGY

## 2024-05-23 PROCEDURE — 700101 HCHG RX REV CODE 250: Performed by: OBSTETRICS & GYNECOLOGY

## 2024-05-23 PROCEDURE — 88305 TISSUE EXAM BY PATHOLOGIST: CPT

## 2024-05-23 PROCEDURE — 160041 HCHG SURGERY MINUTES - EA ADDL 1 MIN LEVEL 4: Performed by: OBSTETRICS & GYNECOLOGY

## 2024-05-23 PROCEDURE — 160029 HCHG SURGERY MINUTES - 1ST 30 MINS LEVEL 4: Performed by: OBSTETRICS & GYNECOLOGY

## 2024-05-23 PROCEDURE — 88302 TISSUE EXAM BY PATHOLOGIST: CPT

## 2024-05-23 PROCEDURE — 160002 HCHG RECOVERY MINUTES (STAT): Performed by: OBSTETRICS & GYNECOLOGY

## 2024-05-23 PROCEDURE — 88307 TISSUE EXAM BY PATHOLOGIST: CPT

## 2024-05-23 PROCEDURE — 700105 HCHG RX REV CODE 258: Performed by: OBSTETRICS & GYNECOLOGY

## 2024-05-23 PROCEDURE — 700102 HCHG RX REV CODE 250 W/ 637 OVERRIDE(OP): Performed by: OBSTETRICS & GYNECOLOGY

## 2024-05-23 PROCEDURE — 160046 HCHG PACU - 1ST 60 MINS PHASE II: Performed by: OBSTETRICS & GYNECOLOGY

## 2024-05-23 PROCEDURE — A9270 NON-COVERED ITEM OR SERVICE: HCPCS | Performed by: ANESTHESIOLOGY

## 2024-05-23 PROCEDURE — 160048 HCHG OR STATISTICAL LEVEL 1-5: Performed by: OBSTETRICS & GYNECOLOGY

## 2024-05-23 PROCEDURE — A9270 NON-COVERED ITEM OR SERVICE: HCPCS | Performed by: OBSTETRICS & GYNECOLOGY

## 2024-05-23 PROCEDURE — 160009 HCHG ANES TIME/MIN: Performed by: OBSTETRICS & GYNECOLOGY

## 2024-05-23 RX ORDER — HYDROMORPHONE HYDROCHLORIDE 1 MG/ML
0.1 INJECTION, SOLUTION INTRAMUSCULAR; INTRAVENOUS; SUBCUTANEOUS
Status: DISCONTINUED | OUTPATIENT
Start: 2024-05-23 | End: 2024-05-23 | Stop reason: HOSPADM

## 2024-05-23 RX ORDER — DIPHENHYDRAMINE HYDROCHLORIDE 50 MG/ML
12.5 INJECTION INTRAMUSCULAR; INTRAVENOUS
Status: DISCONTINUED | OUTPATIENT
Start: 2024-05-23 | End: 2024-05-23 | Stop reason: HOSPADM

## 2024-05-23 RX ORDER — SODIUM CHLORIDE, SODIUM LACTATE, POTASSIUM CHLORIDE, CALCIUM CHLORIDE 600; 310; 30; 20 MG/100ML; MG/100ML; MG/100ML; MG/100ML
INJECTION, SOLUTION INTRAVENOUS CONTINUOUS
Status: ACTIVE | OUTPATIENT
Start: 2024-05-23 | End: 2024-05-23

## 2024-05-23 RX ORDER — ACETAMINOPHEN 500 MG
1000 TABLET ORAL
Status: COMPLETED | OUTPATIENT
Start: 2024-05-23 | End: 2024-05-23

## 2024-05-23 RX ORDER — HALOPERIDOL 5 MG/ML
1 INJECTION INTRAMUSCULAR
Status: DISCONTINUED | OUTPATIENT
Start: 2024-05-23 | End: 2024-05-23 | Stop reason: HOSPADM

## 2024-05-23 RX ORDER — LABETALOL HYDROCHLORIDE 5 MG/ML
INJECTION, SOLUTION INTRAVENOUS PRN
Status: DISCONTINUED | OUTPATIENT
Start: 2024-05-23 | End: 2024-05-23 | Stop reason: SURG

## 2024-05-23 RX ORDER — HYDROMORPHONE HYDROCHLORIDE 1 MG/ML
0.2 INJECTION, SOLUTION INTRAMUSCULAR; INTRAVENOUS; SUBCUTANEOUS
Status: DISCONTINUED | OUTPATIENT
Start: 2024-05-23 | End: 2024-05-23 | Stop reason: HOSPADM

## 2024-05-23 RX ORDER — HYDROMORPHONE HYDROCHLORIDE 2 MG/ML
INJECTION, SOLUTION INTRAMUSCULAR; INTRAVENOUS; SUBCUTANEOUS PRN
Status: DISCONTINUED | OUTPATIENT
Start: 2024-05-23 | End: 2024-05-23 | Stop reason: SURG

## 2024-05-23 RX ORDER — SODIUM CHLORIDE, SODIUM LACTATE, POTASSIUM CHLORIDE, CALCIUM CHLORIDE 600; 310; 30; 20 MG/100ML; MG/100ML; MG/100ML; MG/100ML
INJECTION, SOLUTION INTRAVENOUS CONTINUOUS
Status: DISCONTINUED | OUTPATIENT
Start: 2024-05-23 | End: 2024-05-23 | Stop reason: HOSPADM

## 2024-05-23 RX ORDER — LABETALOL HYDROCHLORIDE 5 MG/ML
5 INJECTION, SOLUTION INTRAVENOUS
Status: DISCONTINUED | OUTPATIENT
Start: 2024-05-23 | End: 2024-05-23 | Stop reason: HOSPADM

## 2024-05-23 RX ORDER — OXYCODONE HCL 5 MG/5 ML
5 SOLUTION, ORAL ORAL
Status: DISCONTINUED | OUTPATIENT
Start: 2024-05-23 | End: 2024-05-23 | Stop reason: HOSPADM

## 2024-05-23 RX ORDER — SCOLOPAMINE TRANSDERMAL SYSTEM 1 MG/1
1 PATCH, EXTENDED RELEASE TRANSDERMAL
Status: DISCONTINUED | OUTPATIENT
Start: 2024-05-23 | End: 2024-05-23 | Stop reason: HOSPADM

## 2024-05-23 RX ORDER — HYDRALAZINE HYDROCHLORIDE 20 MG/ML
5 INJECTION INTRAMUSCULAR; INTRAVENOUS
Status: DISCONTINUED | OUTPATIENT
Start: 2024-05-23 | End: 2024-05-23 | Stop reason: HOSPADM

## 2024-05-23 RX ORDER — OXYCODONE HCL 5 MG/5 ML
10 SOLUTION, ORAL ORAL
Status: DISCONTINUED | OUTPATIENT
Start: 2024-05-23 | End: 2024-05-23 | Stop reason: HOSPADM

## 2024-05-23 RX ORDER — METOCLOPRAMIDE HYDROCHLORIDE 5 MG/ML
INJECTION INTRAMUSCULAR; INTRAVENOUS PRN
Status: DISCONTINUED | OUTPATIENT
Start: 2024-05-23 | End: 2024-05-23 | Stop reason: SURG

## 2024-05-23 RX ORDER — MEPERIDINE HYDROCHLORIDE 25 MG/ML
6.25 INJECTION INTRAMUSCULAR; INTRAVENOUS; SUBCUTANEOUS
Status: DISCONTINUED | OUTPATIENT
Start: 2024-05-23 | End: 2024-05-23 | Stop reason: HOSPADM

## 2024-05-23 RX ORDER — HYDROMORPHONE HYDROCHLORIDE 1 MG/ML
0.4 INJECTION, SOLUTION INTRAMUSCULAR; INTRAVENOUS; SUBCUTANEOUS
Status: DISCONTINUED | OUTPATIENT
Start: 2024-05-23 | End: 2024-05-23 | Stop reason: HOSPADM

## 2024-05-23 RX ORDER — LIDOCAINE HYDROCHLORIDE 20 MG/ML
INJECTION, SOLUTION EPIDURAL; INFILTRATION; INTRACAUDAL; PERINEURAL PRN
Status: DISCONTINUED | OUTPATIENT
Start: 2024-05-23 | End: 2024-05-23 | Stop reason: SURG

## 2024-05-23 RX ORDER — BUPIVACAINE HYDROCHLORIDE AND EPINEPHRINE 2.5; 5 MG/ML; UG/ML
INJECTION, SOLUTION EPIDURAL; INFILTRATION; INTRACAUDAL; PERINEURAL
Status: DISCONTINUED | OUTPATIENT
Start: 2024-05-23 | End: 2024-05-23 | Stop reason: HOSPADM

## 2024-05-23 RX ORDER — EPINEPHRINE 1 MG/ML(1)
AMPUL (ML) INJECTION
Status: DISCONTINUED
Start: 2024-05-23 | End: 2024-05-23 | Stop reason: HOSPADM

## 2024-05-23 RX ORDER — MIDAZOLAM HYDROCHLORIDE 1 MG/ML
INJECTION INTRAMUSCULAR; INTRAVENOUS PRN
Status: DISCONTINUED | OUTPATIENT
Start: 2024-05-23 | End: 2024-05-23 | Stop reason: SURG

## 2024-05-23 RX ORDER — ONDANSETRON 2 MG/ML
4 INJECTION INTRAMUSCULAR; INTRAVENOUS
Status: DISCONTINUED | OUTPATIENT
Start: 2024-05-23 | End: 2024-05-23 | Stop reason: HOSPADM

## 2024-05-23 RX ORDER — CEFAZOLIN SODIUM 1 G/3ML
INJECTION, POWDER, FOR SOLUTION INTRAMUSCULAR; INTRAVENOUS PRN
Status: DISCONTINUED | OUTPATIENT
Start: 2024-05-23 | End: 2024-05-23 | Stop reason: SURG

## 2024-05-23 RX ORDER — BUPIVACAINE HYDROCHLORIDE 2.5 MG/ML
INJECTION, SOLUTION EPIDURAL; INFILTRATION; INTRACAUDAL
Status: DISCONTINUED
Start: 2024-05-23 | End: 2024-05-23 | Stop reason: HOSPADM

## 2024-05-23 RX ORDER — DEXAMETHASONE SODIUM PHOSPHATE 4 MG/ML
INJECTION, SOLUTION INTRA-ARTICULAR; INTRALESIONAL; INTRAMUSCULAR; INTRAVENOUS; SOFT TISSUE PRN
Status: DISCONTINUED | OUTPATIENT
Start: 2024-05-23 | End: 2024-05-23 | Stop reason: SURG

## 2024-05-23 RX ORDER — EPHEDRINE SULFATE 50 MG/ML
5 INJECTION, SOLUTION INTRAVENOUS
Status: DISCONTINUED | OUTPATIENT
Start: 2024-05-23 | End: 2024-05-23 | Stop reason: HOSPADM

## 2024-05-23 RX ORDER — CELECOXIB 200 MG/1
400 CAPSULE ORAL
Status: COMPLETED | OUTPATIENT
Start: 2024-05-23 | End: 2024-05-23

## 2024-05-23 RX ORDER — ROCURONIUM BROMIDE 10 MG/ML
INJECTION, SOLUTION INTRAVENOUS PRN
Status: DISCONTINUED | OUTPATIENT
Start: 2024-05-23 | End: 2024-05-23 | Stop reason: SURG

## 2024-05-23 RX ADMIN — FENTANYL CITRATE 50 MCG: 50 INJECTION, SOLUTION INTRAMUSCULAR; INTRAVENOUS at 10:48

## 2024-05-23 RX ADMIN — ROCURONIUM BROMIDE 70 MG: 50 INJECTION, SOLUTION INTRAVENOUS at 10:24

## 2024-05-23 RX ADMIN — FENTANYL CITRATE 50 MCG: 50 INJECTION, SOLUTION INTRAMUSCULAR; INTRAVENOUS at 11:01

## 2024-05-23 RX ADMIN — MIDAZOLAM HYDROCHLORIDE 2 MG: 1 INJECTION, SOLUTION INTRAMUSCULAR; INTRAVENOUS at 10:16

## 2024-05-23 RX ADMIN — SCOPOLAMINE 1 PATCH: 1.5 PATCH, EXTENDED RELEASE TRANSDERMAL at 09:26

## 2024-05-23 RX ADMIN — HYDROMORPHONE HYDROCHLORIDE 0.4 MG: 2 INJECTION INTRAMUSCULAR; INTRAVENOUS; SUBCUTANEOUS at 11:25

## 2024-05-23 RX ADMIN — FENTANYL CITRATE 50 MCG: 50 INJECTION, SOLUTION INTRAMUSCULAR; INTRAVENOUS at 10:23

## 2024-05-23 RX ADMIN — LABETALOL HYDROCHLORIDE 5 MG: 5 INJECTION, SOLUTION INTRAVENOUS at 11:03

## 2024-05-23 RX ADMIN — DEXAMETHASONE SODIUM PHOSPHATE 8 MG: 4 INJECTION INTRA-ARTICULAR; INTRALESIONAL; INTRAMUSCULAR; INTRAVENOUS; SOFT TISSUE at 10:24

## 2024-05-23 RX ADMIN — CELECOXIB 400 MG: 200 CAPSULE ORAL at 09:26

## 2024-05-23 RX ADMIN — CEFAZOLIN 2 G: 1 INJECTION, POWDER, FOR SOLUTION INTRAMUSCULAR; INTRAVENOUS at 10:30

## 2024-05-23 RX ADMIN — FENTANYL CITRATE 50 MCG: 50 INJECTION, SOLUTION INTRAMUSCULAR; INTRAVENOUS at 10:42

## 2024-05-23 RX ADMIN — ACETAMINOPHEN 1000 MG: 500 TABLET, FILM COATED ORAL at 09:26

## 2024-05-23 RX ADMIN — METOCLOPRAMIDE 10 MG: 5 INJECTION, SOLUTION INTRAMUSCULAR; INTRAVENOUS at 10:24

## 2024-05-23 RX ADMIN — LIDOCAINE HYDROCHLORIDE 100 MG: 20 INJECTION, SOLUTION EPIDURAL; INFILTRATION; INTRACAUDAL at 10:24

## 2024-05-23 RX ADMIN — PROPOFOL 250 MG: 10 INJECTION, EMULSION INTRAVENOUS at 10:24

## 2024-05-23 RX ADMIN — SUGAMMADEX 200 MG: 100 INJECTION, SOLUTION INTRAVENOUS at 11:43

## 2024-05-23 RX ADMIN — SODIUM CHLORIDE, POTASSIUM CHLORIDE, SODIUM LACTATE AND CALCIUM CHLORIDE: 600; 310; 30; 20 INJECTION, SOLUTION INTRAVENOUS at 09:36

## 2024-05-23 RX ADMIN — PROPOFOL 50 MG: 10 INJECTION, EMULSION INTRAVENOUS at 10:27

## 2024-05-23 ASSESSMENT — PAIN DESCRIPTION - PAIN TYPE
TYPE: ACUTE PAIN
TYPE: SURGICAL PAIN

## 2024-05-23 ASSESSMENT — FIBROSIS 4 INDEX: FIB4 SCORE: 0.36

## 2024-05-23 NOTE — ANESTHESIA PREPROCEDURE EVALUATION
Case: [id number] Date/Time: 05/23/24 1015    Procedures:       OPERATIVE LAPAROSCOPY, BILATERAL SALPINGECTOMY, POSSIBLE RIGHT AND/OR LEFT OOPHORECTOMY, OPERATIVE HYSTEROSCOPY WITH RESECTION OF POLYP AND ENDOMETRIAL ABLATION      HYSTEROSCOPY, WITH ENDOMETRIAL RADIOFREQUENCY ABLATION      HYSTEROSCOPY, WITH TISSUE REMOVAL, USING HYSTEROSCOPIC ROTATING CUTTER BLADE    Pre-op diagnosis: ADNEXAL MASS, STERILIZATION, MENORRHAGIA, ENDOMETRIAL MASS    Location: [facility] ROOM 25 / SURGERY SAME DAY [facility]    Surgeons: Deb Leonard M.D.            Relevant Problems   ANESTHESIA   (positive) Obstructive sleep apnea      PULMONARY (within normal limits)      NEURO   (positive) Headache      CARDIAC (within normal limits)      GI (within normal limits)       Physical Exam    Airway   Mallampati: II  TM distance: >3 FB  Neck ROM: full       Cardiovascular - normal exam  Rhythm: regular  Rate: normal  (-) murmur     Dental - normal exam           Pulmonary - normal exam  Breath sounds clear to auscultation     Abdominal    Neurological - normal exam                   Anesthesia Plan    ASA 2       Plan - general       Airway plan will be ETT          Induction: intravenous    Postoperative Plan: Postoperative administration of opioids is intended.    Pertinent diagnostic labs and testing reviewed    Informed Consent:    Anesthetic plan and risks discussed with patient.    Use of blood products discussed with: patient whom consented to blood products.

## 2024-05-23 NOTE — ANESTHESIA PROCEDURE NOTES
Airway    Date/Time: 5/23/2024 10:27 AM    Performed by: Luana Wesley M.D.  Authorized by: Luana Wesley M.D.    Location:  OR  Urgency:  Elective  Indications for Airway Management:  Anesthesia      Spontaneous Ventilation: absent    Sedation Level:  Deep  Preoxygenated: Yes    Patient Position:  Sniffing  Mask Difficulty Assessment:  1 - vent by mask  Final Airway Type:  Endotracheal airway  Final Endotracheal Airway:  ETT  Cuffed: Yes    Technique Used for Successful ETT Placement:  Direct laryngoscopy    Insertion Site:  Oral  Blade Type:  Amaris  Laryngoscope Blade/Videolaryngoscope Blade Size:  3  ETT Size (mm):  6.5  Measured from:  Teeth  ETT to Teeth (cm):  22  Placement Verified by: auscultation and capnometry    Cormack-Lehane Classification:  Grade IIa - partial view of glottis  Number of Attempts at Approach:  1

## 2024-05-23 NOTE — ANESTHESIA POSTPROCEDURE EVALUATION
Patient: Deysi Goodrich    Procedure Summary       Date: 05/23/24 Room / Location: [facility] ROOM 25 / SURGERY SAME DAY [facility]    Anesthesia Start: 1021 Anesthesia Stop: 1151    Procedures:       OPERATIVE LAPAROSCOPY, BILATERAL SALPINGECTOMY, OPERATIVE HYSTEROSCOPY WITH RESECTION OF POLYP AND ENDOMETRIAL ABLATION (Pelvis)      HYSTEROSCOPY, WITH ENDOMETRIAL RADIOFREQUENCY ABLATION (Uterus)      HYSTEROSCOPY, WITH TISSUE REMOVAL, USING HYSTEROSCOPIC ROTATING CUTTER BLADE (Uterus) Diagnosis: (ADNEXAL MASS, STERILIZATION, MENORRHAGIA, ENDOMETRIAL MASS)    Surgeons: Deb Leonard M.D. Responsible Provider: Luana Wesley M.D.    Anesthesia Type: general ASA Status: 2            Final Anesthesia Type: general  Last vitals  BP   Blood Pressure: 135/84    Temp   36.2 °C (97.1 °F)    Pulse   86   Resp   12    SpO2   92 %      Anesthesia Post Evaluation    Patient location during evaluation: PACU  Patient participation: complete - patient participated  Level of consciousness: awake and alert    Airway patency: patent  Anesthetic complications: no  Cardiovascular status: hemodynamically stable  Respiratory status: acceptable  Hydration status: euvolemic    PONV: none          No notable events documented.     Nurse Pain Score: 0 (NPRS)

## 2024-05-23 NOTE — OR NURSING
Introduced self to patient and discussed plan of care. Surgical consent signed. IV inserted without difficulty. Checked oxygen tank on roland, full. Pre op complete.

## 2024-05-23 NOTE — OP REPORT
DATE OF SERVICE:  05/23/2024     PREOPERATIVE DIAGNOSES:    1.  Left adnexal cyst.  2.  Desires sterilization.  3.  Menorrhagia.  4.  Endometrial polyp.     POSTOPERATIVE DIAGNOSES:    1.  Left paratubal cyst.  2.  Desires sterilization.  3.  Menorrhagia.  4.  Endometrial polyp.     PROCEDURES:    1.  Operative laparoscopy with bilateral salpingectomy.  2.  Operative hysteroscopy with resection of endometrial polyp.  3.  NovaSure endometrial ablation.     SURGEON:  Deb Leonard MD     ANESTHESIOLOGIST:  Luana Wesley MD     ANESTHESIA:  General endotracheal.     COMPLICATIONS:  None.     ESTIMATED BLOOD LOSS:  20 mL     FINDINGS:  Normal-appearing uterus.  The left fallopian tube had a 3 cm thick   walled cyst containing clear fluid.  The right fallopian tube appeared normal.    The ovaries bilaterally appeared normal.  On hysteroscopy, there was a small   endometrial polyp.  For endometrial ablation, the cavity length was 4 cm.    The cavity width was 2.9 cm and time was 57 seconds.       INDICATIONS FOR PROCEDURE:  The patient is a 33-year-old nulliparous female.    She suffers menorrhagia.  She has been found to have an endometrial polyp as a   complicating component.  She desires resection of polyp and endometrial   ablation.     The patient desires sterilization requesting bilateral salpingectomy.       The patient is found to have a persistent left adnexal cyst and is recommended   to proceed with surgical excision.       DETAILS OF PROCEDURE:  With consent freely obtained, the patient was taken to   the operating room where she was placed in a dorsal supine position.  After   induction of general endotracheal anesthesia, she was repositioned in a dorsal   lithotomy position.  Abdomen, perineum and vagina were prepped and draped in   the usual sterile fashion.  Timeout was called, the correct patient and   procedure was confirmed.     I placed an acorn manipulator and 's gloves were changed.      Umbilical site was selected and infiltrated with 0.25% Marcaine.  An incision   was made.  A Veress needle inserted.  Once correct intraperitoneal placement   was confirmed, the abdomen was insufflated.  Veress needle was then removed.    An 11 mm trocar and port was inserted and through this, the laparoscope was   placed.  A suprapubic site was selected, infiltrated with 0.25% Marcaine with   epinephrine.  An incision was made and a 4 mm trocar and port was inserted.  A   Prestige grasper was placed.  The above-mentioned pelvic findings were noted.    The left fallopian tube was grasped at its fimbriated end and elevated.  The   distal portion containing the cyst was transected.  In the process, the base   of the cyst was incised and clear fluid was discharged, that specimen went to   pathology separately.  The proximal left fallopian tube was elevated. The   mesosalpinx serially cauterized and transected.  The fallopian tube amputated   and that segment of fallopian tube went for routine examination as part of   salpingectomy.  The right fallopian tube was identified at its fimbriated end,   elevated and the mesosalpinx was serially cauterized and transected.  The   fallopian tube amputated and removed and sent as a routine pathologic   specimen.  The abdomen was then desufflated.  Instruments were removed.    Umbilical fascia closed with an interrupted suture of 0 Vicryl.  The skin   incisions closed with 4-0 Monocryl suture and bandaging was applied.     For hysteroscopy, the acorn manipulator was removed.  The cervix was grasped   with a single tooth tenaculum.  The cervix was slightly dilated to allow   passage of the MyoSure hysteroscope.  The MyoSure hysteroscope was introduced.    Hysteroscopy performed revealing a small endometrial polyp.  The MyoSure   LITE was introduced and resection began to excise the polyp.  Endometrial   curettings were sent as a pathologic specimen.  The MyoSure hysteroscope was    completed.     At this time, the NovaSure device was prepared for endometrial ablation.  The   uterine cavity was measured and the device length was set at 4 cm.  The device   was introduced into the uterine cavity, then deployed.  The width was   determined to be 2.9 cm.  Cavity assessment was performed and when cavity   assessment passed, the device was activated and the ablation cycle initiated.    The ablation cycle continued for 57 seconds when automatic discontinuation   occurred and the cycle was complete.  The array was withdrawn.  Examination   revealed a laceration from the tenaculum.  This was repaired with interrupted   sutures of 2-0 Vicryl and hemostasis was noted.  Luz catheter was removed.    This brought us to the conclusion of the procedure.  The patient tolerated the   procedure well and went to the recovery area, extubated in stable condition.    All sponge, needle and instrument counts were correct x2.        ______________________________  MD HAYLEY AU/GENNA    DD:  05/23/2024 11:52  DT:  05/23/2024 14:50    Job#:  [id number]    CC:MITRA Linder

## 2024-05-23 NOTE — DISCHARGE INSTRUCTIONS
If any questions arise, call your provider.  If your provider is not available, please feel free to call the Surgical Center at [phone].    MEDICATIONS: Resume taking daily medication.  Take prescribed pain medication with food.  If no medication is prescribed, you may take non-aspirin pain medication if needed.  PAIN MEDICATION CAN BE VERY CONSTIPATING.  Take a stool softener or laxative such as senokot, pericolace, or milk of magnesia if needed.    Last pain medication given: Tylenol, Celebrex (Ibuprofen like medication) @ 9:26 AM,   next dose of Tylenol and Ibuprofen can be @ 3:26 PM    Remove Scopalamine patch on 5/26; wash hands thoroughly after removal       What to Expect Post Anesthesia    Rest and take it easy for the first 24 hours.  A responsible adult is recommended to remain with you during that time.  It is normal to feel sleepy.  We encourage you to not do anything that requires balance, judgment or coordination.    FOR 24 HOURS DO NOT:  Drive, operate machinery or run household appliances.  Drink beer or alcoholic beverages.  Make important decisions or sign legal documents.    To avoid nausea, slowly advance diet as tolerated, avoiding spicy or greasy foods for the first day.  Add more substantial food to your diet according to your provider's instructions. INCREASE FLUIDS AND FIBER TO AVOID CONSTIPATION.    MILD FLU-LIKE SYMPTOMS ARE NORMAL.  YOU MAY EXPERIENCE GENERALIZED MUSCLE ACHES, THROAT IRRITATION, HEADACHE AND/OR SOME NAUSEA.

## 2024-05-23 NOTE — OR NURSING
1149 patient arrived from OR, report received, attached to monitoring. VSS, patient oxygenating well on 6 L oxymask, surgical sites to abdomen with dry gauze and tegaderm; clean/dry/intact, patient denies pain and nausea, returns to sleep      1213 Patient tolerating sips of water.    1222 telephone updates to parents Melecio and Ce       1230 patient denies pain and nausea, states needs to use restroom, returns to sleep     1240 patient ambulate to bathroom to void, void successful, changed into clothes with minimal assistance, tolerated well     1245 report called to phase II RN     1252 patient to phase II for discharge, vss, tolerable pain rating 1/10, denies nausea, tolerates oral intake, surgical sites remain clean/dry/intact, patient agrees/asks questions regarding care, safely transferred care to Michelle STILES

## 2024-05-23 NOTE — ANESTHESIA TIME REPORT
Anesthesia Start and Stop Event Times       Date Time Event    5/23/2024 0947 Ready for Procedure     1021 Anesthesia Start     1151 Anesthesia Stop          Responsible Staff  05/23/24      Name Role Begin End    Luana Wesley M.D. Anesth 1021 1151          Overtime Reason:  no overtime (within assigned shift)    Comments:

## 2024-05-23 NOTE — OR NURSING
1252: report from LINSEY Villatoro. Pt arrived to PACU 2 bay #11. Pt is dressed and in a recliner chair. Pt able to void prior to arrival. Connected to monitor. PIV saline locked. Lap sites x2 with gauze and tegaderm in place, clean dry and intact.     1315: Discharge instructions reviewed with pt only per her request. Questions answered. Handouts given to patient.    1318: parents to bedside. Post-anesthesia instructions only given to parents per pt request. No questions asked.    1320: PIV removed with tip intact.     1324: Pt discharged home in stable condition. Down to pick-up area via wheelchair accompanied by ELIZA Branham and parents. All belongings taken.

## 2024-05-23 NOTE — OR SURGEON
Immediate Post OP Note    PreOp Diagnosis: 1. Left Adnexal Cyst, 2. Desires Sterilization, 3. Menorrhagia, 4. Endometrial polyp      PostOp Diagnosis: 1. Left paratubal cyst, 2. Desires Sterilization, 3. Menorrhagia, 4. Endometrial polyp      Procedure(s):  OPERATIVE LAPAROSCOPY, BILATERAL SALPINGECTOMY, OPERATIVE HYSTEROSCOPY WITH RESECTION OF POLYP AND ENDOMETRIAL ABLATION - Wound Class: Clean  HYSTEROSCOPY, WITH ENDOMETRIAL RADIOFREQUENCY ABLATION - Wound Class: Clean  HYSTEROSCOPY, WITH TISSUE REMOVAL, USING HYSTEROSCOPIC ROTATING CUTTER BLADE - Wound Class: Clean    Surgeon(s):  Deb Leonard M.D.    Anesthesiologist/Type of Anesthesia:  Anesthesiologist: Luana Wesley M.D./General    Surgical Staff:  Circulator: Zara Angela R.N.  Scrub Person: Sharri Mcclendon    Specimens removed if any:  ID Type Source Tests Collected by Time Destination   A : LEFT TUBAL CYST Tissue Fallopian Tube PATHOLOGY SPECIMEN Deb Leonard M.D. 5/23/2024 10:50 AM    B : LEFT AND RIGHT FALLOPIAN TUBES Tissue Fallopian Tube PATHOLOGY SPECIMEN Deb Leonard M.D. 5/23/2024 10:58 AM    C : ENDOMETRIAL CURETTINGS Tissue Uterus PATHOLOGY SPECIMEN Deb Leonard M.D. 5/23/2024 11:20 AM        Estimated Blood Loss: 20cc    Findings: normal appearing uterus, left fallopian tube with thick walled cyst, right fallopian tube appears normal.  On hysteroscopy there is a small endometrial polyp.  Novasure settings: length = 4.0 cm, Width = 2.9 cm, Time = 57 sec    Complications: none        5/23/2024 11:41 AM Deb Leonard M.D.

## 2024-07-11 NOTE — DOCUMENTATION QUERY
[facility]                                                                       Query Response Note      PATIENT:               SALMA RAMSAY  ACCT #:                  [id number]  MRN:                     [id number]  :                      1990  ADMIT DATE:       2024 8:31 AM  DISCH DATE:        2024 1:24 PM  RESPONDING  PROVIDER #:        [id number]           QUERY TEXT:    A possible intraoperative condition was noted in the Medical Record.  Please clarify if the condition. Was the laceration an incidental occurrence inherent to the procedure,  or was it a complication of the procedure?    Options provided:    --Inherent to the procedure  --Complication of procedure  --Unable to determine    Thank you,    [email or web address]    The patient's Clinical Indicators include:  Clinical findings- OP report:  Examination revealed a laceration from the tenaculum.     Treatment-  This was repaired with interrupted sutures of 2-0 Vicryl and hemostasis was noted.    Risk Factors-  Possible Intraoperative complication.  Options provided:   -- A complication of the surgery   -- An inherent part of the surgical procedure   -- Clinically insignificant finding   -- Unable to determine      Query created by: Jaimee Anderson on 2024 8:15 AM    RESPONSE TEXT:    Clinically insignificant finding          Electronically signed by:  VERONICA BUSTILLOS MD 2024 8:42 AM

## 2024-10-31 ENCOUNTER — OFFICE VISIT (OUTPATIENT)
Dept: MEDICAL GROUP | Facility: IMAGING CENTER | Age: 34
End: 2024-10-31
Payer: COMMERCIAL

## 2024-10-31 VITALS
BODY MASS INDEX: 37.03 KG/M2 | HEIGHT: 63 IN | RESPIRATION RATE: 16 BRPM | DIASTOLIC BLOOD PRESSURE: 84 MMHG | OXYGEN SATURATION: 97 % | SYSTOLIC BLOOD PRESSURE: 118 MMHG | TEMPERATURE: 98 F | HEART RATE: 80 BPM | WEIGHT: 209 LBS

## 2024-10-31 DIAGNOSIS — F10.90 HEAVY ALCOHOL USE: ICD-10-CM

## 2024-10-31 DIAGNOSIS — R74.01 ELEVATED ALT MEASUREMENT: ICD-10-CM

## 2024-10-31 DIAGNOSIS — F33.42 RECURRENT MAJOR DEPRESSIVE DISORDER, IN FULL REMISSION (HCC): ICD-10-CM

## 2024-10-31 DIAGNOSIS — E66.9 OBESITY (BMI 30-39.9): ICD-10-CM

## 2024-10-31 DIAGNOSIS — F41.1 GENERALIZED ANXIETY DISORDER: ICD-10-CM

## 2024-10-31 DIAGNOSIS — F43.10 POSTTRAUMATIC STRESS DISORDER: ICD-10-CM

## 2024-10-31 DIAGNOSIS — E78.2 MIXED DYSLIPIDEMIA: ICD-10-CM

## 2024-10-31 DIAGNOSIS — G47.33 OBSTRUCTIVE SLEEP APNEA: ICD-10-CM

## 2024-10-31 DIAGNOSIS — R53.82 CHRONIC FATIGUE: ICD-10-CM

## 2024-10-31 PROBLEM — R03.0 ELEVATED BLOOD PRESSURE READING: Status: RESOLVED | Noted: 2023-04-13 | Resolved: 2024-10-31

## 2024-10-31 PROBLEM — E66.812 CLASS 2 SEVERE OBESITY DUE TO EXCESS CALORIES WITH SERIOUS COMORBIDITY AND BODY MASS INDEX (BMI) OF 36.0 TO 36.9 IN ADULT (HCC): Status: RESOLVED | Noted: 2018-02-01 | Resolved: 2024-10-31

## 2024-10-31 PROBLEM — E66.01 CLASS 2 SEVERE OBESITY DUE TO EXCESS CALORIES WITH SERIOUS COMORBIDITY AND BODY MASS INDEX (BMI) OF 36.0 TO 36.9 IN ADULT (HCC): Status: RESOLVED | Noted: 2018-02-01 | Resolved: 2024-10-31

## 2024-10-31 ASSESSMENT — ANXIETY QUESTIONNAIRES
6. BECOMING EASILY ANNOYED OR IRRITABLE: MORE THAN HALF THE DAYS
5. BEING SO RESTLESS THAT IT IS HARD TO SIT STILL: NOT AT ALL
7. FEELING AFRAID AS IF SOMETHING AWFUL MIGHT HAPPEN: MORE THAN HALF THE DAYS
3. WORRYING TOO MUCH ABOUT DIFFERENT THINGS: MORE THAN HALF THE DAYS
GAD7 TOTAL SCORE: 11
2. NOT BEING ABLE TO STOP OR CONTROL WORRYING: MORE THAN HALF THE DAYS
1. FEELING NERVOUS, ANXIOUS, OR ON EDGE: MORE THAN HALF THE DAYS
4. TROUBLE RELAXING: SEVERAL DAYS

## 2024-10-31 ASSESSMENT — FIBROSIS 4 INDEX: FIB4 SCORE: 0.37

## 2024-10-31 ASSESSMENT — PATIENT HEALTH QUESTIONNAIRE - PHQ9: CLINICAL INTERPRETATION OF PHQ2 SCORE: 0

## 2024-11-01 ENCOUNTER — APPOINTMENT (OUTPATIENT)
Dept: LAB | Facility: MEDICAL CENTER | Age: 34
End: 2024-11-01
Payer: COMMERCIAL

## 2024-11-01 DIAGNOSIS — R53.82 CHRONIC FATIGUE: ICD-10-CM

## 2024-11-01 DIAGNOSIS — E78.2 MIXED DYSLIPIDEMIA: ICD-10-CM

## 2024-11-01 DIAGNOSIS — E66.9 OBESITY (BMI 30-39.9): ICD-10-CM

## 2024-11-01 LAB
25(OH)D3 SERPL-MCNC: 23 NG/ML (ref 30–100)
ALBUMIN SERPL BCP-MCNC: 4.7 G/DL (ref 3.2–4.9)
ALBUMIN/GLOB SERPL: 1.5 G/DL
ALP SERPL-CCNC: 98 U/L (ref 30–99)
ALT SERPL-CCNC: 68 U/L (ref 2–50)
ANION GAP SERPL CALC-SCNC: 15 MMOL/L (ref 7–16)
AST SERPL-CCNC: 33 U/L (ref 12–45)
BILIRUB SERPL-MCNC: 0.7 MG/DL (ref 0.1–1.5)
BUN SERPL-MCNC: 12 MG/DL (ref 8–22)
CALCIUM ALBUM COR SERPL-MCNC: 8.7 MG/DL (ref 8.5–10.5)
CALCIUM SERPL-MCNC: 9.3 MG/DL (ref 8.5–10.5)
CHLORIDE SERPL-SCNC: 101 MMOL/L (ref 96–112)
CHOLEST SERPL-MCNC: 205 MG/DL (ref 100–199)
CO2 SERPL-SCNC: 23 MMOL/L (ref 20–33)
CORTIS SERPL-MCNC: 7 UG/DL (ref 0–23)
CREAT SERPL-MCNC: 0.58 MG/DL (ref 0.5–1.4)
CRP SERPL HS-MCNC: 0.61 MG/DL (ref 0–0.75)
ERYTHROCYTE [DISTWIDTH] IN BLOOD BY AUTOMATED COUNT: 43 FL (ref 35.9–50)
ERYTHROCYTE [SEDIMENTATION RATE] IN BLOOD BY WESTERGREN METHOD: 11 MM/HOUR (ref 0–25)
FERRITIN SERPL-MCNC: 58.2 NG/ML (ref 10–291)
FOLATE SERPL-MCNC: 12.7 NG/ML
GFR SERPLBLD CREATININE-BSD FMLA CKD-EPI: 121 ML/MIN/1.73 M 2
GLOBULIN SER CALC-MCNC: 3.1 G/DL (ref 1.9–3.5)
GLUCOSE SERPL-MCNC: 88 MG/DL (ref 65–99)
HCT VFR BLD AUTO: 43.1 % (ref 37–47)
HDLC SERPL-MCNC: 39 MG/DL
HGB BLD-MCNC: 14.4 G/DL (ref 12–16)
IRON SATN MFR SERPL: 18 % (ref 15–55)
IRON SERPL-MCNC: 66 UG/DL (ref 40–170)
LDLC SERPL CALC-MCNC: 144 MG/DL
MCH RBC QN AUTO: 29.6 PG (ref 27–33)
MCHC RBC AUTO-ENTMCNC: 33.4 G/DL (ref 32.2–35.5)
MCV RBC AUTO: 88.7 FL (ref 81.4–97.8)
PLATELET # BLD AUTO: 284 K/UL (ref 164–446)
PMV BLD AUTO: 10.9 FL (ref 9–12.9)
POTASSIUM SERPL-SCNC: 4.3 MMOL/L (ref 3.6–5.5)
PROT SERPL-MCNC: 7.8 G/DL (ref 6–8.2)
RBC # BLD AUTO: 4.86 M/UL (ref 4.2–5.4)
SODIUM SERPL-SCNC: 139 MMOL/L (ref 135–145)
TIBC SERPL-MCNC: 373 UG/DL (ref 250–450)
TRIGL SERPL-MCNC: 111 MG/DL (ref 0–149)
TSH SERPL DL<=0.005 MIU/L-ACNC: 1.68 UIU/ML (ref 0.38–5.33)
UIBC SERPL-MCNC: 307 UG/DL (ref 110–370)
VIT B12 SERPL-MCNC: 369 PG/ML (ref 211–911)
WBC # BLD AUTO: 5.7 K/UL (ref 4.8–10.8)

## 2024-11-01 PROCEDURE — 85027 COMPLETE CBC AUTOMATED: CPT

## 2024-11-01 PROCEDURE — 82607 VITAMIN B-12: CPT

## 2024-11-01 PROCEDURE — 80053 COMPREHEN METABOLIC PANEL: CPT

## 2024-11-01 PROCEDURE — 80061 LIPID PANEL: CPT

## 2024-11-01 PROCEDURE — 80307 DRUG TEST PRSMV CHEM ANLYZR: CPT

## 2024-11-01 PROCEDURE — 82746 ASSAY OF FOLIC ACID SERUM: CPT

## 2024-11-01 PROCEDURE — 86038 ANTINUCLEAR ANTIBODIES: CPT

## 2024-11-01 PROCEDURE — 83540 ASSAY OF IRON: CPT

## 2024-11-01 PROCEDURE — 82728 ASSAY OF FERRITIN: CPT

## 2024-11-01 PROCEDURE — 83550 IRON BINDING TEST: CPT

## 2024-11-01 PROCEDURE — 85652 RBC SED RATE AUTOMATED: CPT

## 2024-11-01 PROCEDURE — 82533 TOTAL CORTISOL: CPT

## 2024-11-01 PROCEDURE — 84443 ASSAY THYROID STIM HORMONE: CPT

## 2024-11-01 PROCEDURE — 86140 C-REACTIVE PROTEIN: CPT

## 2024-11-01 PROCEDURE — 36415 COLL VENOUS BLD VENIPUNCTURE: CPT

## 2024-11-01 PROCEDURE — 82306 VITAMIN D 25 HYDROXY: CPT

## 2024-11-03 LAB
AMPHET CTO UR CFM-MCNC: NEGATIVE NG/ML
BARBITURATES CTO UR CFM-MCNC: NEGATIVE NG/ML
BENZODIAZ CTO UR CFM-MCNC: NEGATIVE NG/ML
CANNABINOIDS CTO UR CFM-MCNC: NEGATIVE NG/ML
COCAINE CTO UR CFM-MCNC: NEGATIVE NG/ML
CREAT UR-MCNC: 43.6 MG/DL (ref 20–400)
DRUG COMMENT 753798: NORMAL
METHADONE CTO UR CFM-MCNC: NEGATIVE NG/ML
OPIATES CTO UR CFM-MCNC: NEGATIVE NG/ML
PCP CTO UR CFM-MCNC: NEGATIVE NG/ML
PROPOXYPH CTO UR CFM-MCNC: NEGATIVE NG/ML

## 2024-11-04 LAB — NUCLEAR IGG SER QL IA: NORMAL

## 2024-11-07 ENCOUNTER — OFFICE VISIT (OUTPATIENT)
Dept: MEDICAL GROUP | Facility: IMAGING CENTER | Age: 34
End: 2024-11-07
Payer: COMMERCIAL

## 2024-11-07 VITALS
OXYGEN SATURATION: 96 % | TEMPERATURE: 98 F | RESPIRATION RATE: 16 BRPM | HEART RATE: 87 BPM | BODY MASS INDEX: 37.32 KG/M2 | SYSTOLIC BLOOD PRESSURE: 122 MMHG | HEIGHT: 63 IN | WEIGHT: 210.6 LBS | DIASTOLIC BLOOD PRESSURE: 82 MMHG

## 2024-11-07 DIAGNOSIS — R74.01 ELEVATED ALT MEASUREMENT: ICD-10-CM

## 2024-11-07 DIAGNOSIS — E78.2 MIXED DYSLIPIDEMIA: ICD-10-CM

## 2024-11-07 DIAGNOSIS — E55.9 VITAMIN D INSUFFICIENCY: ICD-10-CM

## 2024-11-07 DIAGNOSIS — Z71.2 ENCOUNTER TO DISCUSS TEST RESULTS: ICD-10-CM

## 2024-11-07 DIAGNOSIS — R53.82 CHRONIC FATIGUE: ICD-10-CM

## 2024-11-07 DIAGNOSIS — E66.9 OBESITY (BMI 30-39.9): ICD-10-CM

## 2024-11-07 PROCEDURE — 3079F DIAST BP 80-89 MM HG: CPT | Performed by: STUDENT IN AN ORGANIZED HEALTH CARE EDUCATION/TRAINING PROGRAM

## 2024-11-07 PROCEDURE — 3074F SYST BP LT 130 MM HG: CPT | Performed by: STUDENT IN AN ORGANIZED HEALTH CARE EDUCATION/TRAINING PROGRAM

## 2024-11-07 PROCEDURE — 99214 OFFICE O/P EST MOD 30 MIN: CPT | Performed by: STUDENT IN AN ORGANIZED HEALTH CARE EDUCATION/TRAINING PROGRAM

## 2024-11-07 RX ORDER — PHENTERMINE HYDROCHLORIDE 30 MG/1
30 CAPSULE ORAL EVERY MORNING
Qty: 30 CAPSULE | Refills: 0 | Status: SHIPPED | OUTPATIENT
Start: 2024-11-07 | End: 2024-12-07

## 2024-11-07 ASSESSMENT — FIBROSIS 4 INDEX: FIB4 SCORE: 0.49

## 2024-11-08 NOTE — PROGRESS NOTES
"Subjective:     CC:   Chief Complaint   Patient presents with    Lab Results     Labs 11/1       HPI:   Deysi Goodrichnatali, 34 y.o., female,  presents today to discuss:     Test results: completed labs. Has upcoming appt for liver u/s.     High ALT: reports not drinking alcohol in 11 days.     ROS:  No CP, SOB, fever, or chills.     Medications, allergies, past medical history, family history, surgical history, and social history documented in chart and reviewed by me.       Objective:   Exam:  /82 (BP Location: Right arm, Patient Position: Sitting, BP Cuff Size: Adult)   Pulse 87   Temp 36.7 °C (98 °F) (Temporal)   Resp 16   Ht 1.6 m (5' 3\")   Wt 95.5 kg (210 lb 9.6 oz)   SpO2 96%   BMI 37.31 kg/m²      General: In no acute distress. Normal appearance.   Head:   Atraumatic, normocephalic.   Neck: Supple without lymphadenopathy.   Pulmonary: Normal effort, clear to auscultation bilaterally, no wheezes, rhonchi, or rales  Cardiovascular:   Regular rate and rhythm. No murmurs, rubs, or gallops.  Musculoskeletal:  Normal ROM. No edema.    Skin: No visible rashes or lesions.  Neurological: Alert and oriented to person, place, and time. Gait normal.   Psychiatric: Normal mood and affect. Calm and friendly behavior. Speech clear. Normal judgement and insight.         Assessment & Plan:     1. Elevated ALT measurement  Chronic, uncontrolled.  ALT increased from 59 to 68.  Differential diagnosis were discussed with patient.  Additional labs ordered for evaluation.  Patient has upcoming appointment for liver ultrasound.  Patient has not had alcohol in the last 11 days.  Will follow-up with patient after completing labs.  Recommend healthy diet and exercise.  - AFP SERUM TUMOR MARKER; Future  - MITOCHONDRIAL (M2) AB; Future  - HEP A AB TOTAL; Future  - HEP B SURFACE ANTIGEN; Future  - CERULOPLASMIN; Future  - ALPHA-1-ANTITRYPSIN; Future  - ACTIN (SMOOTH MUSCLE) ANTIBODY  - HEP C VIRUS ANTIBODY; " Future   Latest Reference Range & Units 10/24/23 07:34 05/10/24 10:46 11/01/24 10:32   ALT(SGPT) 2 - 50 U/L 70 (H) 59 (H) 68 (H)   (H): Data is abnormally high    2. Obesity (BMI 30-39.9)  Chronic and uncontrolled.  Pt is struggling with weight loss and would like to try Phentermine to help with weight loss. Denies history of seizures, heart disease, kidney/liver disease, and HTN.  Counseled patient on medication and possible side effects including but not limited to increase in blood pressure, tachycardia, arrhythmias, anxiety, irritability, GI upset, diarrhea, and constipation.  Patient wishes to proceed.  Denies drinking alcohol and using drugs. UDS is negative.  Patient signed controlled substance agreement and agrees to abide by the controlled substance agreement.  PDMP reviewed today and appropriate.  A prescription of phentermine 30 mg daily was provided to patient.  Emphasized the importance of adhering to a healthy diet and exercise. Recommend follow-up in 1 month or sooner if needed.   - Controlled Substance Treatment Agreement    3. Chronic fatigue  Chronic.  Stable. Sed rate, CBC, CMP, iron, ferritin, folic acid, B12, CRP, TSH and HIRO were reviewed today and unremarkable.  Will treat the vitamin D insufficiency.  If fatigue does not resolve, recommend additional workup.    4. Mixed dyslipidemia  Chronic and stable.  Improving.  Discussed lifestyle modifications to help lower cholesterol.   Latest Reference Range & Units 10/24/23 07:34 11/01/24 10:32   Cholesterol,Tot 100 - 199 mg/dL 212 (H) 205 (H)   Triglycerides 0 - 149 mg/dL 111 111   HDL >=40 mg/dL 38 ! 39 !   LDL <100 mg/dL 152 (H) 144 (H)   (H): Data is abnormally high  !: Data is abnormal    5. Vitamin D insufficiency  Acute.  New finding.  Recommend over-the-counter vitamin D3 1000 or 2000 IUs daily with a meal.  Will continue to monitor.   Latest Reference Range & Units 11/01/24 10:32   25-Hydroxy   Vitamin D 25 30 - 100 ng/mL 23 (L)   (L): Data  is abnormally low             Diagnosis and treatment plan explained to pt. Counseled pt on new medication(s) and potential side effects. Pt agreed with treatment plan and verbalized understanding.     Return in about 4 weeks (around 12/5/2024) for Weight Check.     Please note that this dictation was created using voice recognition software. I have made every reasonable attempt to correct obvious errors, but I expect that there are errors of grammar and possibly content that I did not discover before finalizing the note.    Eden Hawley PA-C  [facility]

## 2024-11-15 ENCOUNTER — HOSPITAL ENCOUNTER (OUTPATIENT)
Dept: LAB | Facility: MEDICAL CENTER | Age: 34
End: 2024-11-15
Attending: STUDENT IN AN ORGANIZED HEALTH CARE EDUCATION/TRAINING PROGRAM
Payer: COMMERCIAL

## 2024-11-15 DIAGNOSIS — R74.01 ELEVATED ALT MEASUREMENT: ICD-10-CM

## 2024-11-15 LAB
HBV SURFACE AG SER QL: NONREACTIVE
HCV AB SER QL: NONREACTIVE

## 2024-11-15 PROCEDURE — 86708 HEPATITIS A ANTIBODY: CPT

## 2024-11-15 PROCEDURE — 82105 ALPHA-FETOPROTEIN SERUM: CPT

## 2024-11-15 PROCEDURE — 82103 ALPHA-1-ANTITRYPSIN TOTAL: CPT

## 2024-11-15 PROCEDURE — 86803 HEPATITIS C AB TEST: CPT

## 2024-11-15 PROCEDURE — 82390 ASSAY OF CERULOPLASMIN: CPT

## 2024-11-15 PROCEDURE — 86381 MITOCHONDRIAL ANTIBODY EACH: CPT

## 2024-11-15 PROCEDURE — 86015 ACTIN ANTIBODY EACH: CPT

## 2024-11-15 PROCEDURE — 36415 COLL VENOUS BLD VENIPUNCTURE: CPT

## 2024-11-15 PROCEDURE — 87340 HEPATITIS B SURFACE AG IA: CPT

## 2024-11-17 LAB — AFP-TM SERPL-MCNC: 4 NG/ML (ref 0–9)

## 2024-11-18 LAB
A1AT SERPL-MCNC: 126 MG/DL (ref 90–200)
CERULOPLASMIN SERPL-MCNC: 31 MG/DL (ref 16–45)
HAV AB SER QL IA: POSITIVE
MITOCHONDRIA M2 IGG SER-ACNC: 1.8 UNITS (ref 0–24.9)
SMA IGG SER-ACNC: 8 UNITS (ref 0–19)

## 2024-12-06 ENCOUNTER — HOSPITAL ENCOUNTER (OUTPATIENT)
Dept: RADIOLOGY | Facility: MEDICAL CENTER | Age: 34
End: 2024-12-06
Attending: STUDENT IN AN ORGANIZED HEALTH CARE EDUCATION/TRAINING PROGRAM
Payer: COMMERCIAL

## 2024-12-06 ENCOUNTER — APPOINTMENT (OUTPATIENT)
Dept: MEDICAL GROUP | Facility: IMAGING CENTER | Age: 34
End: 2024-12-06
Payer: COMMERCIAL

## 2024-12-06 VITALS
BODY MASS INDEX: 36.86 KG/M2 | HEIGHT: 63 IN | WEIGHT: 208 LBS | SYSTOLIC BLOOD PRESSURE: 122 MMHG | OXYGEN SATURATION: 97 % | HEART RATE: 98 BPM | RESPIRATION RATE: 16 BRPM | TEMPERATURE: 98.6 F | DIASTOLIC BLOOD PRESSURE: 84 MMHG

## 2024-12-06 DIAGNOSIS — R74.01 ELEVATED ALT MEASUREMENT: ICD-10-CM

## 2024-12-06 DIAGNOSIS — E66.9 OBESITY (BMI 30-39.9): ICD-10-CM

## 2024-12-06 DIAGNOSIS — K76.0 HEPATIC STEATOSIS: ICD-10-CM

## 2024-12-06 DIAGNOSIS — R16.0 HEPATOMEGALY: ICD-10-CM

## 2024-12-06 DIAGNOSIS — F10.90 HEAVY ALCOHOL USE: ICD-10-CM

## 2024-12-06 DIAGNOSIS — R76.8 HEPATITIS A ANTIBODY POSITIVE: ICD-10-CM

## 2024-12-06 PROCEDURE — 3074F SYST BP LT 130 MM HG: CPT | Performed by: STUDENT IN AN ORGANIZED HEALTH CARE EDUCATION/TRAINING PROGRAM

## 2024-12-06 PROCEDURE — 99214 OFFICE O/P EST MOD 30 MIN: CPT | Performed by: STUDENT IN AN ORGANIZED HEALTH CARE EDUCATION/TRAINING PROGRAM

## 2024-12-06 PROCEDURE — 3079F DIAST BP 80-89 MM HG: CPT | Performed by: STUDENT IN AN ORGANIZED HEALTH CARE EDUCATION/TRAINING PROGRAM

## 2024-12-06 PROCEDURE — 76705 ECHO EXAM OF ABDOMEN: CPT

## 2024-12-06 ASSESSMENT — FIBROSIS 4 INDEX: FIB4 SCORE: 0.49

## 2024-12-06 NOTE — PROGRESS NOTES
"Subjective:     CC:   Chief Complaint   Patient presents with    Lab Results     Ultrasound results 12/6       Weight Check       HPI:     Verbal consent was acquired by the patient to use [employer] ambient listening note generation during this visit Yes      Deysi Amin natali Goodrich, 34 y.o., female,  presents today to discuss:     History of Present Illness  The patient presents for a follow-up regarding weight management, blood work, and ultrasound results.    She has been administered phentermine 30 mg daily in the morning to facilitate weight reduction. The patient reports a reduction in fatigue and a decrease in food cravings. Her appetite has markedly diminished, resulting in some weight loss. She expresses satisfaction with the current dosage of phentermine and prefers not to increase it due to concerns about potential insomnia. She has been monitoring her heart rate, which has remained within normal limits. The patient last consumed alcohol 39 days ago. She denies chest pain, dyspnea, and palpitations.        ROS:  See HPI    Medications, allergies, past medical history, family history, surgical history, and social history documented in chart and reviewed by me.       Objective:   Exam:  /84 (BP Location: Left arm, Patient Position: Sitting, BP Cuff Size: Adult)   Pulse 98   Temp 37 °C (98.6 °F) (Temporal)   Resp 16   Ht 1.6 m (5' 3\")   Wt 94.3 kg (208 lb)   LMP 05/15/2024 (Approximate) Comment: no periods since surgery  SpO2 97%   BMI 36.85 kg/m²      General: In no acute distress. Normal appearance.   Head:   Atraumatic, normocephalic.   Neck: Supple without lymphadenopathy.   Pulmonary: Normal effort, clear to auscultation bilaterally, no wheezes, rhonchi, or rales  Cardiovascular:   Regular rate and rhythm. No murmurs, rubs, or gallops.  Musculoskeletal:  Normal ROM. No edema.    Skin: No visible rashes or lesions.  Neurological: Alert and oriented to person, place, and time. Gait " normal.   Psychiatric: Normal mood and affect. Calm and friendly behavior. Speech clear. Normal judgement and insight.         Assessment & Plan:       Assessment & Plan    1. Obesity (BMI 30-39.9)  Chronic, improving. Her weight has decreased from 210 to 208 since the last visit. She reports feeling significantly less tired and having fewer cravings since starting phentermine. Patient would like to continue with phentermine 30 mg. It was recommended to monitor her pulse and blood pressure due to its stimulant properties. She was advised to monitor her weight at home, preferably in the morning after urination and without clothing. A healthy diet and regular exercise were encouraged.     2. Hepatomegaly  3. Hepatic steatosis  New diagnosis. Ultrasound reveals hepatomegaly and steatosis. She was advised to avoid alcohol.  Healthy diet and exercise are also encouraged to help prevent complications.  - HEPATIC FUNCTION PANEL; Future  US-RUQ   IMPRESSION:     1.  Hepatomegaly and steatosis. No free fluid.     2.  No gallstones or dilatation of the common duct.   Latest Reference Range & Units 11/15/24 11:40   Anti-Mitochondrial Ab 0.0 - 24.9 Units 1.8   F-Actin Ab, IgG 0 - 19 Units 8      Latest Reference Range & Units 11/15/24 11:40   Hepatitis B Surface Antigen Non-Reactive  Nonreactive   Hepatitis C Antibody Non-Reactive  Nonreactive      Latest Reference Range & Units 11/15/24 11:40   Alpha Fetoprotein 0 - 9 ng/mL 4   Alpha-1-Antitrypsin 90 - 200 mg/dL 126   Ceruloplasmin 16 - 45 mg/dL 31     4. Hepatitis A antibody positive  Acute. The presence of hepatitis A antibodies could indicate acute infection or prior vaccination. She does not have any symptoms such as abdominal pain, pale skin, nausea, vomiting, diarrhea, fever, or chills to indicate active infection.    5. Elevated ALT measurement  Chronic, patient has discontinued alcohol. Will repeat hepatic function panel in 3 months.  - HEPATIC FUNCTION PANEL; Future             Pt agreed with treatment plan and verbalized understanding.     Return in about 2 months (around 2/6/2025) for weight f/u.     Please note that this dictation was created using voice recognition software. I have made every reasonable attempt to correct obvious errors, but I expect that there are errors of grammar and possibly content that I did not discover before finalizing the note.    Eden Hawley PA-C  [facility]

## 2024-12-07 DIAGNOSIS — E66.9 OBESITY (BMI 30-39.9): ICD-10-CM

## 2024-12-09 RX ORDER — PHENTERMINE HYDROCHLORIDE 30 MG/1
30 CAPSULE ORAL EVERY MORNING
Qty: 30 CAPSULE | Refills: 0 | Status: SHIPPED | OUTPATIENT
Start: 2024-12-09 | End: 2025-01-08

## 2024-12-09 NOTE — TELEPHONE ENCOUNTER
Received request via: Pharmacy    Was the patient seen in the last year in this department? Yes    Does the patient have an active prescription (recently filled or refills available) for medication(s) requested? No    Pharmacy Name: [id number]    Does the patient have jail Plus and need 100-day supply? (This applies to ALL medications) Patient does not have SCP

## 2025-02-07 ENCOUNTER — APPOINTMENT (OUTPATIENT)
Dept: MEDICAL GROUP | Facility: IMAGING CENTER | Age: 35
End: 2025-02-07
Payer: COMMERCIAL

## 2025-05-28 ENCOUNTER — APPOINTMENT (OUTPATIENT)
Dept: URBAN - METROPOLITAN AREA CLINIC 6 | Facility: CLINIC | Age: 35
Setting detail: DERMATOLOGY
End: 2025-05-28

## 2025-05-28 DIAGNOSIS — L81.4 OTHER MELANIN HYPERPIGMENTATION: ICD-10-CM

## 2025-05-28 DIAGNOSIS — Z71.89 OTHER SPECIFIED COUNSELING: ICD-10-CM

## 2025-05-28 DIAGNOSIS — D22 MELANOCYTIC NEVI: ICD-10-CM

## 2025-05-28 DIAGNOSIS — L82.1 OTHER SEBORRHEIC KERATOSIS: ICD-10-CM

## 2025-05-28 DIAGNOSIS — D18.0 HEMANGIOMA: ICD-10-CM

## 2025-05-28 DIAGNOSIS — L71.8 OTHER ROSACEA: ICD-10-CM

## 2025-05-28 DIAGNOSIS — L81.1 CHLOASMA: ICD-10-CM

## 2025-05-28 PROBLEM — D22.62 MELANOCYTIC NEVI OF LEFT UPPER LIMB, INCLUDING SHOULDER: Status: ACTIVE | Noted: 2025-05-28

## 2025-05-28 PROBLEM — D18.01 HEMANGIOMA OF SKIN AND SUBCUTANEOUS TISSUE: Status: ACTIVE | Noted: 2025-05-28

## 2025-05-28 PROBLEM — D22.71 MELANOCYTIC NEVI OF RIGHT LOWER LIMB, INCLUDING HIP: Status: ACTIVE | Noted: 2025-05-28

## 2025-05-28 PROBLEM — D22.5 MELANOCYTIC NEVI OF TRUNK: Status: ACTIVE | Noted: 2025-05-28

## 2025-05-28 PROBLEM — D22.72 MELANOCYTIC NEVI OF LEFT LOWER LIMB, INCLUDING HIP: Status: ACTIVE | Noted: 2025-05-28

## 2025-05-28 PROBLEM — D22.61 MELANOCYTIC NEVI OF RIGHT UPPER LIMB, INCLUDING SHOULDER: Status: ACTIVE | Noted: 2025-05-28

## 2025-05-28 PROCEDURE — ? SUNSCREEN RECOMMENDATIONS

## 2025-05-28 PROCEDURE — 99214 OFFICE O/P EST MOD 30 MIN: CPT

## 2025-05-28 PROCEDURE — ? COUNSELING

## 2025-05-28 PROCEDURE — ? DIAGNOSIS COMMENT

## 2025-05-28 PROCEDURE — ? PRESCRIPTION MEDICATION MANAGEMENT

## 2025-05-28 PROCEDURE — ? PRESCRIPTION

## 2025-05-28 PROCEDURE — ? PHOTO-DOCUMENTATION

## 2025-05-28 RX ORDER — H-QUINONE/TRETINOIN/HYDROCORT 6 %-0.025%
EMULSION (GRAM) TOPICAL QHS
Qty: 30 | Refills: 3 | Status: ERX | COMMUNITY
Start: 2025-05-28

## 2025-05-28 RX ADMIN — Medication: at 00:00

## 2025-05-28 ASSESSMENT — LOCATION SIMPLE DESCRIPTION DERM
LOCATION SIMPLE: LEFT THIGH
LOCATION SIMPLE: RIGHT FOREHEAD
LOCATION SIMPLE: LEFT PRETIBIAL REGION
LOCATION SIMPLE: LEFT KNEE
LOCATION SIMPLE: CHEST
LOCATION SIMPLE: LEFT UPPER ARM
LOCATION SIMPLE: ABDOMEN
LOCATION SIMPLE: LEFT CHEEK
LOCATION SIMPLE: RIGHT PRETIBIAL REGION
LOCATION SIMPLE: RIGHT FOREARM
LOCATION SIMPLE: RIGHT KNEE
LOCATION SIMPLE: RIGHT UPPER ARM
LOCATION SIMPLE: RIGHT THIGH
LOCATION SIMPLE: LEFT FOREARM

## 2025-05-28 ASSESSMENT — LOCATION DETAILED DESCRIPTION DERM
LOCATION DETAILED: LEFT ANTERIOR DISTAL UPPER ARM
LOCATION DETAILED: RIGHT VENTRAL PROXIMAL FOREARM
LOCATION DETAILED: RIGHT KNEE
LOCATION DETAILED: LEFT PROXIMAL PRETIBIAL REGION
LOCATION DETAILED: LEFT KNEE
LOCATION DETAILED: RIGHT ANTERIOR DISTAL UPPER ARM
LOCATION DETAILED: RIGHT PROXIMAL PRETIBIAL REGION
LOCATION DETAILED: RIGHT ANTECUBITAL SKIN
LOCATION DETAILED: LEFT INFERIOR CENTRAL MALAR CHEEK
LOCATION DETAILED: LEFT VENTRAL PROXIMAL FOREARM
LOCATION DETAILED: LOWER STERNUM
LOCATION DETAILED: EPIGASTRIC SKIN
LOCATION DETAILED: RIGHT INFERIOR MEDIAL FOREHEAD
LOCATION DETAILED: LEFT ANTERIOR PROXIMAL UPPER ARM
LOCATION DETAILED: RIGHT ANTERIOR PROXIMAL UPPER ARM
LOCATION DETAILED: RIGHT ANTERIOR DISTAL THIGH
LOCATION DETAILED: PERIUMBILICAL SKIN
LOCATION DETAILED: LEFT ANTERIOR DISTAL THIGH

## 2025-05-28 ASSESSMENT — LOCATION ZONE DERM
LOCATION ZONE: FACE
LOCATION ZONE: ARM
LOCATION ZONE: TRUNK
LOCATION ZONE: LEG

## 2025-05-28 NOTE — PROCEDURE: PRESCRIPTION MEDICATION MANAGEMENT
Initiate Treatment: SM4 ROSACEA 30G \\n15% azelaic acid, 1% ivermectin, 1% metronidazole\\nVitamin C serum
Render In Strict Bullet Format?: No
Detail Level: Zone
Initiate Treatment: Mekam 6 %-0.025 %-0.5 % topical emulsion Qhs

## 2025-05-28 NOTE — PROCEDURE: DIAGNOSIS COMMENT
Detail Level: Simple
Comment: Present for years. Has tried topical metrogel and doxycycline without significant improvement. \\nWill start triple rosacea cream through [facility] and follow up in 3 months.
Render Risk Assessment In Note?: no

## 2025-08-12 ENCOUNTER — OFFICE VISIT (OUTPATIENT)
Dept: URGENT CARE | Facility: PHYSICIAN GROUP | Age: 35
End: 2025-08-12
Payer: COMMERCIAL

## 2025-08-12 ENCOUNTER — HOSPITAL ENCOUNTER (OUTPATIENT)
Facility: MEDICAL CENTER | Age: 35
End: 2025-08-12
Attending: PHYSICIAN ASSISTANT
Payer: COMMERCIAL

## 2025-08-12 VITALS
HEART RATE: 96 BPM | RESPIRATION RATE: 16 BRPM | SYSTOLIC BLOOD PRESSURE: 158 MMHG | BODY MASS INDEX: 35.79 KG/M2 | OXYGEN SATURATION: 96 % | HEIGHT: 63 IN | TEMPERATURE: 97.1 F | WEIGHT: 202 LBS | DIASTOLIC BLOOD PRESSURE: 96 MMHG

## 2025-08-12 DIAGNOSIS — R10.2 ACUTE SUPRAPUBIC PAIN: ICD-10-CM

## 2025-08-12 DIAGNOSIS — R03.0 ELEVATED BLOOD PRESSURE READING: ICD-10-CM

## 2025-08-12 DIAGNOSIS — N76.0 ACUTE VAGINITIS: ICD-10-CM

## 2025-08-12 DIAGNOSIS — R39.9 UTI SYMPTOMS: ICD-10-CM

## 2025-08-12 LAB
APPEARANCE UR: CLEAR
BILIRUB UR STRIP-MCNC: NEGATIVE MG/DL
COLOR UR AUTO: NORMAL
GLUCOSE UR STRIP.AUTO-MCNC: NEGATIVE MG/DL
KETONES UR STRIP.AUTO-MCNC: NORMAL MG/DL
LEUKOCYTE ESTERASE UR QL STRIP.AUTO: NEGATIVE
NITRITE UR QL STRIP.AUTO: NEGATIVE
PH UR STRIP.AUTO: 6 [PH] (ref 5–8)
POCT INT CON NEG: NEGATIVE
POCT INT CON POS: POSITIVE
POCT URINE PREGNANCY TEST: NEGATIVE
PROT UR QL STRIP: NEGATIVE MG/DL
RBC UR QL AUTO: NEGATIVE
SP GR UR STRIP.AUTO: <=1.005
UROBILINOGEN UR STRIP-MCNC: 0.2 MG/DL

## 2025-08-12 PROCEDURE — 81002 URINALYSIS NONAUTO W/O SCOPE: CPT | Performed by: PHYSICIAN ASSISTANT

## 2025-08-12 PROCEDURE — 3080F DIAST BP >= 90 MM HG: CPT | Performed by: PHYSICIAN ASSISTANT

## 2025-08-12 PROCEDURE — 3077F SYST BP >= 140 MM HG: CPT | Performed by: PHYSICIAN ASSISTANT

## 2025-08-12 PROCEDURE — 87480 CANDIDA DNA DIR PROBE: CPT

## 2025-08-12 PROCEDURE — 87510 GARDNER VAG DNA DIR PROBE: CPT

## 2025-08-12 PROCEDURE — 81025 URINE PREGNANCY TEST: CPT | Performed by: PHYSICIAN ASSISTANT

## 2025-08-12 PROCEDURE — 99214 OFFICE O/P EST MOD 30 MIN: CPT | Performed by: PHYSICIAN ASSISTANT

## 2025-08-12 PROCEDURE — 87660 TRICHOMONAS VAGIN DIR PROBE: CPT

## 2025-08-12 RX ORDER — PHENTERMINE HYDROCHLORIDE 30 MG/1
30 CAPSULE ORAL EVERY MORNING
COMMUNITY

## 2025-08-12 ASSESSMENT — ENCOUNTER SYMPTOMS
CARDIOVASCULAR NEGATIVE: 1
BLOOD IN STOOL: 0
FLANK PAIN: 0
NAUSEA: 1
CONSTIPATION: 0
VOMITING: 0
ABDOMINAL PAIN: 1
CONSTITUTIONAL NEGATIVE: 1
DIARRHEA: 0
RESPIRATORY NEGATIVE: 1

## 2025-08-12 ASSESSMENT — FIBROSIS 4 INDEX: FIB4 SCORE: 0.51

## 2025-08-14 DIAGNOSIS — N76.0 ACUTE VAGINITIS: ICD-10-CM

## 2025-08-14 LAB
AMBIGUOUS DTTM AMBI4: NORMAL
CANDIDA DNA VAG QL PROBE+SIG AMP: NEGATIVE
G VAGINALIS DNA VAG QL PROBE+SIG AMP: NEGATIVE
T VAGINALIS DNA VAG QL PROBE+SIG AMP: NEGATIVE

## (undated) DEVICE — CANISTER SUCTION 3000ML MECHANICAL FILTER AUTO SHUTOFF MEDI-VAC NONSTERILE LF DISP  (40EA/CA)

## (undated) DEVICE — SUTURE 4-0 MONOCRYL PLUS PS-2 - 27 INCH (36/BX)

## (undated) DEVICE — ADVANCED NOVASURE STERILE DEVICE (3EA/PK)

## (undated) DEVICE — SET LEADWIRE 5 LEAD BEDSIDE DISPOSABLE ECG (1SET OF 5/EA)

## (undated) DEVICE — PAD SANITARY 11IN MAXI IND WRAPPED  (12EA/PK 24PK/CA)

## (undated) DEVICE — PACK TRENGUARD 450 PROCEDURE (12EA/CA)

## (undated) DEVICE — NEEDLE INSFL 120MM 14GA VRRS - (20/BX)

## (undated) DEVICE — SENSOR OXIMETER ADULT SPO2 RD SET (20EA/BX)

## (undated) DEVICE — BANDAID X-LARGE 2 X 4 IN LF (50EA/BX)

## (undated) DEVICE — GOWN WARMING STANDARD FLEX - (30/CA)

## (undated) DEVICE — TRAY FOLEY CATHETER STATLOCK 16FR SURESTEP  (10EA/CA)

## (undated) DEVICE — TUBE E-T HI-LO CUFF 6.5MM (10EA/BX)

## (undated) DEVICE — SYRINGE NON SAFETY 3 CC 21 GA X 1 1/2 IN (100/BX 8BX/CA)

## (undated) DEVICE — ELECTRODE DUAL RETURN W/ CORD - (50/PK)

## (undated) DEVICE — MASK OXYGEN VNYL ADLT MED CONC WITH 7 FOOT TUBING  - (50EA/CA)

## (undated) DEVICE — Device

## (undated) DEVICE — SET TUBING AQUILEX IN-FLOW MYOSURE (10EA/BX)

## (undated) DEVICE — SUTURE GENERAL

## (undated) DEVICE — KIT  I.V. START (100EA/CA)

## (undated) DEVICE — TROCAR Z THREAD 11 X 100 - BLADED (6/BX)

## (undated) DEVICE — TOWEL STOP TIMEOUT SAFETY FLAG (40EA/CA)

## (undated) DEVICE — CANNULA O2 COMFORT SOFT EAR ADULT 7 FT TUBING (50/CA)

## (undated) DEVICE — SODIUM CHL IRRIGATION 0.9% 1000ML (12EA/CA)

## (undated) DEVICE — TUBING CLEARLINK DUO-VENT - C-FLO (48EA/CA)

## (undated) DEVICE — SET TUBING PNEUMOCLEAR HIGH FLOW SMOKE EVACUATION (10EA/BX)

## (undated) DEVICE — TROCAR5X55 KII SHIELDED SYS - (6/BX)

## (undated) DEVICE — SPECIMEN PLASTIC CONVERTOR - (10/CA)

## (undated) DEVICE — TROCAR 5X100 BLADED ADVANCE - FIXATION (6/BX)

## (undated) DEVICE — WATER IRRIGATION STERILE 1000ML (12EA/CA)

## (undated) DEVICE — GLOVE BIOGEL SZ 6.5 SURGICAL PF LTX (50PR/BX 4BX/CA)

## (undated) DEVICE — CANISTER SUCTION RIGID RED 1500CC (40EA/CA)

## (undated) DEVICE — SET TUBING AQUILEX OUT-FLOW MYOSURE (10EA/BX)

## (undated) DEVICE — SUCTION INSTRUMENT YANKAUER BULBOUS TIP W/O VENT (50EA/CA)

## (undated) DEVICE — DEVICE REMOVAL MYOSURE LITE TISSUE (3EA/BX)

## (undated) DEVICE — GLOVE SZ 6.5 BIOGEL PI MICRO - PF LF (50PR/BX)

## (undated) DEVICE — SUTURE 0 VICRYL PLUS UR-6 - 27 INCH (36/BX)

## (undated) DEVICE — GLOVE BIOGEL SZ 7 SURGICAL PF LTX - (50PR/BX 4BX/CA)

## (undated) DEVICE — TUBE CONNECTING SUCTION - CLEAR PLASTIC STERILE 72 IN (50EA/CA)

## (undated) DEVICE — SLEEVE VASO CALF MED - (10PR/CA)

## (undated) DEVICE — MAT PATIENT POSITIONING PREVALON (10EA/CA)

## (undated) DEVICE — SODIUM CHL. IRRIGATION 0.9% 3000ML (4EA/CA 65CA/PF)

## (undated) DEVICE — LACTATED RINGERS INJ 1000 ML - (14EA/CA 60CA/PF)